# Patient Record
Sex: FEMALE | Race: WHITE | NOT HISPANIC OR LATINO | Employment: OTHER | URBAN - METROPOLITAN AREA
[De-identification: names, ages, dates, MRNs, and addresses within clinical notes are randomized per-mention and may not be internally consistent; named-entity substitution may affect disease eponyms.]

---

## 2017-08-11 ENCOUNTER — TRANSCRIBE ORDERS (OUTPATIENT)
Dept: ADMINISTRATIVE | Age: 62
End: 2017-08-11

## 2017-08-11 ENCOUNTER — APPOINTMENT (OUTPATIENT)
Dept: LAB | Age: 62
End: 2017-08-11

## 2017-08-11 DIAGNOSIS — Z00.8 HEALTH EXAMINATION IN POPULATION SURVEY: Primary | ICD-10-CM

## 2017-08-11 DIAGNOSIS — Z00.8 HEALTH EXAMINATION IN POPULATION SURVEY: ICD-10-CM

## 2017-08-11 LAB
CHOLEST SERPL-MCNC: 262 MG/DL (ref 50–200)
EST. AVERAGE GLUCOSE BLD GHB EST-MCNC: 111 MG/DL
HBA1C MFR BLD: 5.5 % (ref 4.2–6.3)
HDLC SERPL-MCNC: 83 MG/DL (ref 40–60)
LDLC SERPL CALC-MCNC: 155 MG/DL (ref 0–100)
TRIGL SERPL-MCNC: 118 MG/DL

## 2017-08-11 PROCEDURE — 83036 HEMOGLOBIN GLYCOSYLATED A1C: CPT

## 2017-08-11 PROCEDURE — 80061 LIPID PANEL: CPT

## 2017-08-11 PROCEDURE — 36415 COLL VENOUS BLD VENIPUNCTURE: CPT

## 2018-08-24 ENCOUNTER — TRANSCRIBE ORDERS (OUTPATIENT)
Dept: ADMINISTRATIVE | Facility: HOSPITAL | Age: 63
End: 2018-08-24

## 2018-08-24 ENCOUNTER — APPOINTMENT (OUTPATIENT)
Dept: LAB | Facility: HOSPITAL | Age: 63
End: 2018-08-24

## 2018-08-24 DIAGNOSIS — Z00.8 HEALTH EXAMINATION IN POPULATION SURVEYS: ICD-10-CM

## 2018-08-24 DIAGNOSIS — Z00.8 HEALTH EXAMINATION IN POPULATION SURVEYS: Primary | ICD-10-CM

## 2018-08-24 LAB
CHOLEST SERPL-MCNC: 257 MG/DL (ref 50–200)
HDLC SERPL-MCNC: 81 MG/DL (ref 40–60)
LDLC SERPL CALC-MCNC: 156 MG/DL (ref 0–100)
NONHDLC SERPL-MCNC: 176 MG/DL
TRIGL SERPL-MCNC: 100 MG/DL

## 2018-08-24 PROCEDURE — 83036 HEMOGLOBIN GLYCOSYLATED A1C: CPT

## 2018-08-24 PROCEDURE — 80061 LIPID PANEL: CPT

## 2018-08-24 PROCEDURE — 36415 COLL VENOUS BLD VENIPUNCTURE: CPT

## 2018-08-25 LAB
EST. AVERAGE GLUCOSE BLD GHB EST-MCNC: 105 MG/DL
HBA1C MFR BLD: 5.3 % (ref 4.2–6.3)

## 2022-09-06 ENCOUNTER — OFFICE VISIT (OUTPATIENT)
Dept: FAMILY MEDICINE CLINIC | Facility: MEDICAL CENTER | Age: 67
End: 2022-09-06
Payer: MEDICARE

## 2022-09-06 VITALS
HEART RATE: 78 BPM | DIASTOLIC BLOOD PRESSURE: 80 MMHG | BODY MASS INDEX: 25.16 KG/M2 | SYSTOLIC BLOOD PRESSURE: 130 MMHG | WEIGHT: 151 LBS | OXYGEN SATURATION: 98 % | TEMPERATURE: 98.2 F | HEIGHT: 65 IN

## 2022-09-06 DIAGNOSIS — Z13.1 SCREENING FOR DIABETES MELLITUS: ICD-10-CM

## 2022-09-06 DIAGNOSIS — G89.29 CHRONIC PAIN OF RIGHT KNEE: Primary | ICD-10-CM

## 2022-09-06 DIAGNOSIS — Z78.0 POSTMENOPAUSAL: ICD-10-CM

## 2022-09-06 DIAGNOSIS — Z12.11 SCREENING FOR MALIGNANT NEOPLASM OF COLON: ICD-10-CM

## 2022-09-06 DIAGNOSIS — Z00.00 ANNUAL PHYSICAL EXAM: ICD-10-CM

## 2022-09-06 DIAGNOSIS — Z12.31 ENCOUNTER FOR SCREENING MAMMOGRAM FOR BREAST CANCER: ICD-10-CM

## 2022-09-06 DIAGNOSIS — Z13.220 SCREENING FOR LIPID DISORDERS: ICD-10-CM

## 2022-09-06 DIAGNOSIS — M25.561 CHRONIC PAIN OF RIGHT KNEE: Primary | ICD-10-CM

## 2022-09-06 PROCEDURE — 99204 OFFICE O/P NEW MOD 45 MIN: CPT | Performed by: FAMILY MEDICINE

## 2022-09-06 RX ORDER — MULTIVITAMIN
1 CAPSULE ORAL DAILY
COMMUNITY

## 2022-09-06 NOTE — ASSESSMENT & PLAN NOTE
She is having chronic pain in her right knee for several months at least, is limiting her taking walks, etc  She has seen physical therapist and she needs an order for that  I suggest also that she get an x-ray  Follow-up as needed

## 2022-09-06 NOTE — PROGRESS NOTES
ADULT ANNUAL [address]  [facility]    NAME: Antonio Daley  AGE: 78 y o  SEX: female  : 1955     DATE: 2022     Assessment and Plan:     Problem List Items Addressed This Visit        Other    Chronic pain of right knee - Primary     She is having chronic pain in her right knee for several months at least, is limiting her taking walks, etc  She has seen physical therapist and she needs an order for that  I suggest also that she get an x-ray  Follow-up as needed  Relevant Orders    Ambulatory Referral to Physical Therapy    XR knee 3 vw right non injury      Other Visit Diagnoses     Encounter for screening mammogram for breast cancer        Relevant Orders    Mammo screening bilateral w 3d & cad    Postmenopausal        Screening for malignant neoplasm of colon        Relevant Orders    Cologuard    Screening for diabetes mellitus        Relevant Orders    Comprehensive metabolic panel    Screening for lipid disorders        Relevant Orders    Lipid Panel with Direct LDL reflex    Annual physical exam              Immunizations and preventive care screenings were discussed with patient today  Appropriate education was printed on patient's after visit summary  Counseling:  Exercise: the importance of regular exercise/physical activity was discussed  Recommend exercise 3-5 times per week for at least 30 minutes  No follow-ups on file  Chief Complaint:     Chief Complaint   Patient presents with   [facility] Establish Care     Would like to have an order for PT       History of Present Illness:     Adult Annual Physical   Patient here for a comprehensive physical exam  The patient reports no problems  This is a pleasant 61-year-old woman who is  and has three adult sons  She is a retired nurse in the [place]  She is not had any colon cancer and breast cancer screening    She agrees to getting a Cologuard and a mammogram     Diet and Physical Activity  Diet/Nutrition: well balanced diet  Exercise: walking  Depression Screening  PHQ-2/9 Depression Screening    Little interest or pleasure in doing things: 0 - not at all  Feeling down, depressed, or hopeless: 0 - not at all  PHQ-2 Score: 0  PHQ-2 Interpretation: Negative depression screen       General Health  Sleep: sleeps well  Hearing: normal - bilateral   Vision: no vision problems  Dental: regular dental visits  /GYN Health  Patient is: postmenopausal  Last menstrual period:   Contraceptive method: Not needed  Review of Systems:     Review of Systems   Constitutional: Negative for chills and fever  HENT: Negative for ear pain and sore throat  Eyes: Negative for pain and visual disturbance  Respiratory: Negative for cough and shortness of breath  Cardiovascular: Negative for chest pain and palpitations  Gastrointestinal: Negative for abdominal pain and vomiting  Genitourinary: Negative for dysuria and hematuria  Musculoskeletal: Positive for arthralgias ( right knee pain)  Negative for back pain  Skin: Negative for color change and rash  Neurological: Negative for seizures and syncope  Psychiatric/Behavioral: Negative for dysphoric mood  The patient is not nervous/anxious  All other systems reviewed and are negative  Past Medical History:     History reviewed  No pertinent past medical history     Past Surgical History:     Past Surgical History:   Procedure Laterality Date     SECTION        Social History:     Social History     Socioeconomic History    Marital status: /Civil Union     Spouse name: None    Number of children: None    Years of education: None    Highest education level: None   Occupational History    None   Tobacco Use    Smoking status: Never Smoker    Smokeless tobacco: Never Used   Substance and Sexual Activity    Alcohol use: Yes     Comment: socially    Drug use: Never    Sexual activity: None   Other Topics Concern    None   Social History Narrative    None     Social Determinants of Health     Financial Resource Strain: Not on file   Food Insecurity: Not on file   Transportation Needs: Not on file   Physical Activity: Not on file   Stress: Not on file   Social Connections: Not on file   Intimate Partner Violence: Not on file   Housing Stability: Not on file      Family History:     Family History   Problem Relation Age of Onset    Coronary artery disease Mother     Dementia Father     No Known Problems Sister     No Known Problems Sister     No Known Problems Brother     No Known Problems Brother     No Known Problems Brother     No Known Problems Son     No Known Problems Son     No Known Problems Son       Current Medications:     Current Outpatient Medications   Medication Sig Dispense Refill    Ascorbic Acid (VITAMIN C PO) Take by mouth      MAGNESIUM PO Take by mouth      Multiple Vitamin (multivitamin) capsule Take 1 capsule by mouth daily      Multiple Vitamins-Minerals (VITAMIN D3 COMPLETE PO) Take by mouth      Multiple Vitamins-Minerals (ZINC PO) Take by mouth       No current facility-administered medications for this visit  Allergies:     No Known Allergies   Physical Exam:     /80 (BP Location: Left arm, Patient Position: Sitting, Cuff Size: Adult)   Pulse 78   Temp 98 2 °F (36 8 °C)   Ht 5' 5" (1 651 m)   Wt 68 5 kg (151 lb)   SpO2 98%   BMI 25 13 kg/m²     Physical Exam  Vitals and nursing note reviewed  Constitutional:       Appearance: Normal appearance  She is well-developed  HENT:      Head: Normocephalic  Right Ear: Tympanic membrane and ear canal normal       Left Ear: Tympanic membrane and ear canal normal       Nose: Nose normal  No mucosal edema, congestion or rhinorrhea  Mouth/Throat:      Mouth: Mucous membranes are moist       Pharynx: Uvula midline  No oropharyngeal exudate        Tonsils: No tonsillar exudate  Eyes:      General: Lids are normal       Conjunctiva/sclera: Conjunctivae normal       Pupils: Pupils are equal, round, and reactive to light  Neck:      Thyroid: No thyromegaly  Vascular: No carotid bruit  Trachea: Trachea normal    Cardiovascular:      Rate and Rhythm: Normal rate and regular rhythm  Pulses: Normal pulses  Heart sounds: Normal heart sounds, S1 normal and S2 normal  No murmur heard  Pulmonary:      Effort: Pulmonary effort is normal  No respiratory distress  Breath sounds: Normal breath sounds  No wheezing, rhonchi or rales  Abdominal:      General: Bowel sounds are normal       Palpations: Abdomen is soft  Tenderness: There is no abdominal tenderness  Hernia: No hernia is present  Musculoskeletal:         General: No swelling or deformity  Normal range of motion  Cervical back: Normal range of motion  Lymphadenopathy:      Cervical: No cervical adenopathy  Skin:     General: Skin is warm and dry  Findings: No rash  Neurological:      General: No focal deficit present  Mental Status: She is alert and oriented to person, place, and time  Mental status is at baseline  Cranial Nerves: No cranial nerve deficit  Sensory: No sensory deficit  Coordination: Coordination normal       Deep Tendon Reflexes: Reflexes are normal and symmetric  Psychiatric:         Mood and Affect: Mood normal          Speech: Speech normal          Behavior: Behavior normal  Behavior is cooperative  Thought Content:  Thought content normal          Judgment: Judgment normal           Pam Talamantes MD  [address] [facility]

## 2022-09-06 NOTE — PATIENT INSTRUCTIONS

## 2022-09-14 ENCOUNTER — APPOINTMENT (OUTPATIENT)
Dept: LAB | Facility: MEDICAL CENTER | Age: 67
End: 2022-09-14
Payer: MEDICARE

## 2022-09-14 ENCOUNTER — APPOINTMENT (OUTPATIENT)
Dept: RADIOLOGY | Facility: MEDICAL CENTER | Age: 67
End: 2022-09-14
Payer: MEDICARE

## 2022-09-14 DIAGNOSIS — Z13.220 SCREENING FOR LIPID DISORDERS: ICD-10-CM

## 2022-09-14 DIAGNOSIS — Z13.1 SCREENING FOR DIABETES MELLITUS: ICD-10-CM

## 2022-09-14 DIAGNOSIS — M25.561 CHRONIC PAIN OF RIGHT KNEE: ICD-10-CM

## 2022-09-14 DIAGNOSIS — G89.29 CHRONIC PAIN OF RIGHT KNEE: ICD-10-CM

## 2022-09-14 LAB
ALBUMIN SERPL BCP-MCNC: 4.1 G/DL (ref 3.5–5)
ALP SERPL-CCNC: 85 U/L (ref 46–116)
ALT SERPL W P-5'-P-CCNC: 27 U/L (ref 12–78)
ANION GAP SERPL CALCULATED.3IONS-SCNC: 5 MMOL/L (ref 4–13)
AST SERPL W P-5'-P-CCNC: 13 U/L (ref 5–45)
BILIRUB SERPL-MCNC: 0.7 MG/DL (ref 0.2–1)
BUN SERPL-MCNC: 13 MG/DL (ref 5–25)
CALCIUM SERPL-MCNC: 9.2 MG/DL (ref 8.3–10.1)
CHLORIDE SERPL-SCNC: 108 MMOL/L (ref 96–108)
CHOLEST SERPL-MCNC: 224 MG/DL
CO2 SERPL-SCNC: 25 MMOL/L (ref 21–32)
CREAT SERPL-MCNC: 0.87 MG/DL (ref 0.6–1.3)
GFR SERPL CREATININE-BSD FRML MDRD: 69 ML/MIN/1.73SQ M
GLUCOSE P FAST SERPL-MCNC: 104 MG/DL (ref 65–99)
HDLC SERPL-MCNC: 66 MG/DL
LDLC SERPL CALC-MCNC: 141 MG/DL (ref 0–100)
POTASSIUM SERPL-SCNC: 4.2 MMOL/L (ref 3.5–5.3)
PROT SERPL-MCNC: 7.4 G/DL (ref 6.4–8.4)
SODIUM SERPL-SCNC: 138 MMOL/L (ref 135–147)
TRIGL SERPL-MCNC: 84 MG/DL

## 2022-09-14 PROCEDURE — 73562 X-RAY EXAM OF KNEE 3: CPT

## 2022-09-14 PROCEDURE — 36415 COLL VENOUS BLD VENIPUNCTURE: CPT

## 2022-09-14 PROCEDURE — 80061 LIPID PANEL: CPT

## 2022-09-14 PROCEDURE — 80053 COMPREHEN METABOLIC PANEL: CPT

## 2022-09-15 ENCOUNTER — EVALUATION (OUTPATIENT)
Dept: PHYSICAL THERAPY | Facility: CLINIC | Age: 67
End: 2022-09-15
Payer: MEDICARE

## 2022-09-15 DIAGNOSIS — M25.561 CHRONIC PAIN OF RIGHT KNEE: ICD-10-CM

## 2022-09-15 DIAGNOSIS — G89.29 CHRONIC PAIN OF RIGHT KNEE: ICD-10-CM

## 2022-09-15 PROCEDURE — 97161 PT EVAL LOW COMPLEX 20 MIN: CPT | Performed by: PHYSICAL THERAPIST

## 2022-09-15 PROCEDURE — 97110 THERAPEUTIC EXERCISES: CPT | Performed by: PHYSICAL THERAPIST

## 2022-09-15 PROCEDURE — 97140 MANUAL THERAPY 1/> REGIONS: CPT | Performed by: PHYSICAL THERAPIST

## 2022-09-15 NOTE — PROGRESS NOTES
PT Evaluation     Today's date: 9/15/2022  Patient name: Delvis Engle  : 1955  MRN: [id number]  Referring provider: Qing Clayton MD  Dx:   Encounter Diagnosis     ICD-10-CM    1  Chronic pain of right knee  M25 561 Ambulatory Referral to Physical Therapy    G89 29        Start Time: 1015  Stop Time: 1100  Total time in clinic (min): 45 minutes    Assessment  Assessment details: Patient is a 78 y o  female who presents to physical therapy with c/o chronic right knee pain  Patient presents to evaluation with pain, decreased range of motion, decreased strength, decreased quality of gait/ambulatory status, and decreased tolerance to activity  Patient demonstrates excellent tolerance to treatment and was provided with a written copy of their initial home exercise program focusing on knee ROM and was encouraged to perform daily per tolerance  I discussed risks, benefits, and alternatives to treatment, and answered all patient questions to patient satisfaction  Patient presents with baseline FOTO score of 47 indicating limited tolerance/ability to complete ADLs  Patient is an appropriate candidate for skilled PT and would benefit from skilled PT services to address the aforementioned impairments, achieve goals, maximize function, and improve quality of life  Pt is in agreement with this plan      Patient Education: activity modifications as needed, pacing of activities, importance of HEP compliance, PT prognosis/POC    Impairments: abnormal gait, abnormal or restricted ROM, activity intolerance, impaired balance, impaired physical strength, lacks appropriate home exercise program and pain with function    Goals  ST weeks  Pt will demonstrate good understanding and compliance with HEP   Pt will increase active right knee extension to 0* to normalize gait with TKE at IC phase of gait   Pt will increase active right knee flexion to 120* to normalize gait and improve tolerance/ability to perform functional activities to include stairs and squatting   Pt will decrease right knee pain to 3-4/10 with activity    LT weeks  Pt will increase right hip/knee strength to 5-/5 to allow for improved ability to squat, negotiate stairs, and prolonged community ambulation  Pt will decrease right knee pain to 0-1/10 with activity   Pt will demonstrate negative Elys test on right   Pt will ambulate with least restrictive AD and normal gait mechanics  Pt will exhibit proper squatting/lifting mechanics without reliance on external cues for correction of form   Pt will be able to tolerate prolonged standing/walking activities > 30 minutes without provocation of knee pain    Pt will improve FOTO score to > or = to 68 to indicate improved functional abilities       Plan  Plan details: 2-3x/wk  Patient would benefit from: skilled physical therapy and PT eval  Planned modality interventions: cryotherapy and thermotherapy: hydrocollator packs  Planned therapy interventions: ADL training, manual therapy, neuromuscular re-education, patient education, self care, strengthening, stretching, therapeutic activities, therapeutic exercise, home exercise program, graded exercise, graded activity, gait training, functional ROM exercises, flexibility, body mechanics training and balance  Frequency: 3x week  Duration in weeks: 8  Plan of Care beginning date: 9/15/2022  Plan of Care expiration date: 11/10/2022  Treatment plan discussed with: patient        Subjective Evaluation    History of Present Illness  Mechanism of injury: Pt reports to PT with c/o right knee pain that started about 2 years ago that progressively worsened with time  Pt states she had an MRI about 8 years ago that revealed meniscus tears and cartilage defects but never did anything about it  Pt states she had surgery in  due to having a dislocated knee cap and had a full recovery from this   Pt states over time she started to lose knee ROM and now can't bend or straighten knee  Pt ambulates unassisted but walks with abnormal gait  Pt reports pain with prolonged walking/standing, stairs, squatting, kneeling, sleeping at night, and morning stiffness  Pt denies buckling symptoms but does note hx of infrequent locking episodes  Pt denies N/T into extremities  Pt denies prior treatment to knee       Aggravating factors: prolonged walking/standing, stairs, squatting, kneeling, sleeping at night, and morning stiffness    Easing Factors:  Rest, ice    PLOF:  Hx of knee surgery in , constant knee pain for past 2 years    PMH: Hx of knee surgery in   Pain  Current pain ratin  At best pain ratin  At worst pain ratin  Quality: sharp and dull ache      Diagnostic Tests  X-ray: abnormal  Patient Goals  Patient goals for therapy: decreased pain, improved balance, increased motion, increased strength, independence with ADLs/IADLs and return to sport/leisure activities          Objective     General Comments:      Knee Comments  Right knee AROM: 0-20-70* (stiff/pain with end ranges) - improved post tx to 0-10-80*    Right knee MMT: Flexion 5-/5 Extension 5-/5    Right hip MMT: Flex 4/5 Abd 4/5 Extension 4/5    Gait Assessment: Pt ambulates unassisted with antalgic gait, maintains knee flexion throughout gait cycle, circumduction of right leg during mid-swing phase of gait    Palpation: no TTP about knee    Varus stress: (-)  Valgus stress: (-)  Ant Drawer: (-)  Lachmans: (-)  Patellar Compression: (-)  Patellar Apprehension: (-)  Apley: unable to assess due to ROM limitations  Elys: unable to assess due to ROM limitations    FOTO: 47               Diagnosis: R knee pain   Precautions: (-)   POC Expires: 11/10/22   Re-evaluation Date: 10/13/22   FOTO Scores/Date: Goal - 68; 9/15/22 - 47   Visit Count 1/10       Manuals 9/15       R knee PROM all planes KD                                       Ther Ex 9/15       Heel slides with strap 10x5" (78*), then 10x actively       Seated knee flexion at table 10x10"       Heel prop 3# 3 min       Quad set 10x10"                                        HEP Creation/instruction       Neuro Re-Ed                                                                                                                       Ther Act                                         Modalities

## 2022-09-16 ENCOUNTER — OFFICE VISIT (OUTPATIENT)
Dept: PHYSICAL THERAPY | Facility: CLINIC | Age: 67
End: 2022-09-16
Payer: MEDICARE

## 2022-09-16 DIAGNOSIS — M25.561 CHRONIC PAIN OF RIGHT KNEE: Primary | ICD-10-CM

## 2022-09-16 DIAGNOSIS — G89.29 CHRONIC PAIN OF RIGHT KNEE: Primary | ICD-10-CM

## 2022-09-16 PROCEDURE — 97140 MANUAL THERAPY 1/> REGIONS: CPT | Performed by: PHYSICAL THERAPIST

## 2022-09-16 PROCEDURE — 97110 THERAPEUTIC EXERCISES: CPT | Performed by: PHYSICAL THERAPIST

## 2022-09-16 NOTE — PROGRESS NOTES
Daily Note     Today's date: 2022  Patient name: Carmine Conway  : 1955  MRN: [id number]  Referring provider: Bay Scott MD  Dx:   Encounter Diagnosis     ICD-10-CM    1  Chronic pain of right knee  M25 561     G89 29        Start Time: 930  Stop Time: 1015  Total time in clinic (min): 45 minutes    Subjective: Pt denies any pain after initial evaluation and felt better, has been compliant with her home exercises  Objective: See treatment diary below      Assessment: Pt demonstrates gradual improvement in knee ROM and improved tolerance with manual interventions with less guarding  Pt was able to progress knee flexion ROM with addition of wall slides with good tolerance - occasional cues to avoid compensatory hip hiking and weight shift to left during ROM exercises and manual PROM  Pt was able to progress active knee flexion/extension with HS curls and LAQs with cues for short isometric hold at end range with extensive cues during LAQs to avoid compensatory hip flexion and extending spine to ensure motion is achieved at knee  Pt was issued updated HEP and encouraged to continue with daily, as well as working on gait mechanics achieving adequate hip/knee flexion through mid-swing phase of gait and TKE at IC and mid-stance without circumduction of leg  Will continue to progress as able next visit  Plan: Continue per plan of care  Progress treatment as tolerated         Diagnosis: R knee pain   Precautions: (-)   POC Expires: 11/10/22   Re-evaluation Date: 10/13/22   FOTO Scores/Date: Goal - 68; 9/15/22 - 52   Visit Count 1/10 2/10      Manuals 9/15 9/16      R knee PROM all planes KD KD                                      Ther Ex 9/15 9/16      Bike  3 min half revolutions      Heel slides with strap 10x5" (78*), then 10x actively 10x5" (85*), then 10x actively      Wall slides  3 min      HS curls  Red 20x3"      Seated knee flexion at table 10x10" HEP      Heel prop 3# 3 min 3# 3 min Quad set 10x10"  10x10"      LAQ  20x3"      Standing TKE  Blue 10x10"                      HEP Creation/instruction Updated       Neuro Re-Ed                                                                                                                       Ther Act                                         Modalities

## 2022-09-20 ENCOUNTER — APPOINTMENT (OUTPATIENT)
Dept: PHYSICAL THERAPY | Facility: CLINIC | Age: 67
End: 2022-09-20
Payer: MEDICARE

## 2022-09-20 ENCOUNTER — OFFICE VISIT (OUTPATIENT)
Dept: PHYSICAL THERAPY | Facility: CLINIC | Age: 67
End: 2022-09-20
Payer: MEDICARE

## 2022-09-20 DIAGNOSIS — M25.561 CHRONIC PAIN OF RIGHT KNEE: Primary | ICD-10-CM

## 2022-09-20 DIAGNOSIS — G89.29 CHRONIC PAIN OF RIGHT KNEE: Primary | ICD-10-CM

## 2022-09-20 PROCEDURE — 97110 THERAPEUTIC EXERCISES: CPT | Performed by: PHYSICAL THERAPIST

## 2022-09-20 PROCEDURE — 97140 MANUAL THERAPY 1/> REGIONS: CPT | Performed by: PHYSICAL THERAPIST

## 2022-09-20 NOTE — PROGRESS NOTES
Daily Note     Today's date: 2022  Patient name: Ansley Barker  : 1955  MRN: [id number]  Referring provider: Anatoliy Morrow MD  Dx:   Encounter Diagnosis     ICD-10-CM    1  Chronic pain of right knee  M25 561     G89 29        Start Time: 1145  Stop Time: 1230  Total time in clinic (min): 45 minutes    Subjective: Pt reports current pain 3-/10, states last night a bad night due to weather and being up on her feet for a long time  Pt overall feels good progress with motion and feels she is walking better  Objective: See treatment diary below      Assessment:  Pt continues to make steady gains in knee mobility with improving tolerance with manual tx, however still with moderate restrictions into both end range flexion/extension with best measurements obtained today to be 0-8-86* with A/AAROM  Trialed tibiofemoral distraction with medial gapping to help reduce knee pain with favorably response  Added prone quad stretch with perceived end range stiffness and quad stretching noted and instructed to add to HEP  Pt was also able to progress closed chain knee flexion to include TRX assisted DL squats with extensive cues on avoiding compensatory weight shift to left or moving left foot posteriorly to bias left leg  Pt reports significant reduction in pain and improved mobility/walking tolerance post tx  HEP was updated and reviewed  Plan: Continue per plan of care  Progress treatment as tolerated         Diagnosis: R knee pain   Precautions: (-)   POC Expires: 11/10/22   Re-evaluation Date: 10/13/22   FOTO Scores/Date: Goal - 68; 9/15/22 - 52   Visit Count 1/10 2/10 3/10     Manuals 9/15 9/16 9/20     R knee PROM all planes KD KD KD     Tibia-femoral distraction w/ medial opening   KD                             Ther Ex 9/15 9/16 9/20     Bike  3 min half revolutions 4 min half revolutions     Heel slides with strap 10x5" (78*), then 10x actively 10x5" (85*), then 10x actively 10x5" (86*), then 10x actively     Prone quad stretch   3x30"      Wall slides  3 min 3 min (85*)     HS curls  Red 20x3" Red 20x3"      Heel prop 3# 3 min 3# 3 min 5# 3 min     Quad set 10x10"  10x10" 10x10"     LAQ  20x3" 20x3"     Standing TKE  Blue 10x10" Blue 10x10"      TRX    DL squat depth to tolerance 2x10             HEP Creation/instruction Updated  Updated      Neuro Re-Ed                                                                                                                       Ther Act                                         Modalities

## 2022-09-21 ENCOUNTER — OFFICE VISIT (OUTPATIENT)
Dept: PHYSICAL THERAPY | Facility: CLINIC | Age: 67
End: 2022-09-21
Payer: MEDICARE

## 2022-09-21 DIAGNOSIS — G89.29 CHRONIC PAIN OF RIGHT KNEE: Primary | ICD-10-CM

## 2022-09-21 DIAGNOSIS — M25.561 CHRONIC PAIN OF RIGHT KNEE: Primary | ICD-10-CM

## 2022-09-21 PROCEDURE — 97110 THERAPEUTIC EXERCISES: CPT | Performed by: PHYSICAL THERAPIST

## 2022-09-21 PROCEDURE — 97140 MANUAL THERAPY 1/> REGIONS: CPT | Performed by: PHYSICAL THERAPIST

## 2022-09-21 NOTE — PROGRESS NOTES
Daily Note     Today's date: 2022  Patient name: Jeana Fatima  : 1955  MRN: [id number]  Referring provider: Barry Bautista MD  Dx:   Encounter Diagnosis     ICD-10-CM    1  Chronic pain of right knee  M25 561     G89 29        Start Time: 0800  Stop Time: 0850  Total time in clinic (min): 50 minutes    Subjective: Pt reports stiffness this morning with normal pain last night as she usually gets, felt better after last visit with improved motion/walking  Objective: See treatment diary below      Assessment: Pt continues to make gradual progressions in A/AAROM with more c/o end range stiffness versus pain  Pt was able to further progress closed chain strengthening with step ups with extensive cues for proper hip/knee flexion without circumduction of right leg during concentric phase, as well as proper hinging of knee during eccentric lowering with improving form with repetition  Pt less reliant on cues with improving form during TRX assisted DL squats with less discomfort/stiffness noted at end range  Pt encouraged to continue with her home exercises daily as symptoms allow and will progress next visit as able  Plan: Continue per plan of care  Progress treatment as tolerated         Diagnosis: R knee pain   Precautions: (-)   POC Expires: 11/10/22   Re-evaluation Date: 10/13/22   FOTO Scores/Date: Goal - 68; 9/15/22 - 52   Visit Count 1/10 2/10 3/10 4/10    Manuals 9/15 9/16 9/20 9/21    R knee PROM all planes KD KD KD KD    Tibia-femoral distraction w/ medial opening   KD KD                            Ther Ex 9/15 9/16 9/20 9/21    Bike  3 min half revolutions 4 min half revolutions 4 min half revolutions    Heel slides with strap 10x5" (78*), then 10x actively 10x5" (85*), then 10x actively 10x5" (86*), then 10x actively 10x5" (87*), then 10x actively    Prone quad stretch   3x30"  3x30"     Wall slides  3 min 3 min (85*) 3 min (89*)    HS curls  Red 20x3" Red 20x3"  Red 20x3"     Heel prop 3# 3 min 3# 3 min 5# 3 min 5# 3 min    Quad set 10x10"  10x10" 10x10" 10x10" (-6*)    LAQ  20x3" 20x3" 20x3"    Standing TKE  Blue 10x10" Blue 10x10"  Blue 10x10"     TRX    DL squat depth to tolerance 2x10 DL squat depth to tolerance 2x10    Leg press    DL 85# 2x10    Step ups    8" 2x10                            HEP Creation/instruction Updated  Updated  Updated    Neuro Re-Ed                                                                                                                       Ther Act                                         Modalities

## 2022-09-22 ENCOUNTER — OFFICE VISIT (OUTPATIENT)
Dept: PHYSICAL THERAPY | Facility: CLINIC | Age: 67
End: 2022-09-22
Payer: MEDICARE

## 2022-09-22 DIAGNOSIS — G89.29 CHRONIC PAIN OF RIGHT KNEE: Primary | ICD-10-CM

## 2022-09-22 DIAGNOSIS — M25.561 CHRONIC PAIN OF RIGHT KNEE: Primary | ICD-10-CM

## 2022-09-22 PROCEDURE — 97110 THERAPEUTIC EXERCISES: CPT | Performed by: PHYSICAL THERAPIST

## 2022-09-22 PROCEDURE — 97140 MANUAL THERAPY 1/> REGIONS: CPT | Performed by: PHYSICAL THERAPIST

## 2022-09-22 NOTE — PROGRESS NOTES
Daily Note     Today's date: 2022  Patient name: Tamara Cheek  : 1955  MRN: [id number]  Referring provider: Christine Ellis MD  Dx:   Encounter Diagnosis     ICD-10-CM    1  Chronic pain of right knee  M25 561     G89 29        Start Time: 1545  Stop Time: 1635  Total time in clinic (min): 50 minutes    Subjective: Pt reports feeling better after last visit but woke up this morning with more pain, rated 7-8/10 and attributes this to the inclement weather but felt better as she got moving for the day  Objective: See treatment diary below      Assessment: Pt continues to progress well with ROM and exhibiting improving functional knee flexion/extension mobility with squatting and step ups but still reliant on cues for correction of form and avoidance of compensatory strategies  Added nadeem step overs to work on further challenging of gait mechanics with cues to achieve proper hip/knee flexion during mid-swing phase of gait and TKE at IC and mid-stance phases of gait with tendencies to circumduction leg and extend hip during mid-swing portion but corrected with cues  Pt continues to note reduced pain and improved quality of gait post tx  HEP was updated and reviewed  Plan: Continue per plan of care  Progress treatment as tolerated         Diagnosis: R knee pain   Precautions: (-)   POC Expires: 11/10/22   Re-evaluation Date: 10/13/22   FOTO Scores/Date: Goal - 68; 9/15/22 - 52   Visit Count 1/10 2/10 3/10 4/10 5/10   Manuals 9/15 9/16 9/20 9/21 9/22   R knee PROM all planes KD KD KD KD KD   Tibia-femoral distraction w/ medial opening   KD KD KD                           Ther Ex 9/15 9/16 9/20 9/21 9/22   Bike  3 min half revolutions 4 min half revolutions 4 min half revolutions 4 min half revolutions   Heel slides with strap 10x5" (78*), then 10x actively 10x5" (85*), then 10x actively 10x5" (86*), then 10x actively 10x5" (87*), then 10x actively 10x5" (87*), then 10x actively   Prone quad stretch   3x30"  3x30"  3x30"   Wall slides  3 min 3 min (85*) 3 min (89*) 3 min   HS curls  Red 20x3" Red 20x3"  Red 20x3"  Red 20x3"   Heel prop 3# 3 min 3# 3 min 5# 3 min 5# 3 min 5# 3 min   Quad set 10x10"  10x10" 10x10" 10x10" (-6*) 10x10"    LAQ  20x3" 20x3" 20x3" 20x3"   Standing TKE  Blue 10x10" Blue 10x10"  Blue 10x10"  Blue 10x10"   TRX    DL squat depth to tolerance 2x10 DL squat depth to tolerance 2x10 DL squat depth to tolerance 2x10   Leg press    DL 85# 2x10 DL 95# 2x10   Step ups    8" 2x10 8" 2x10    Blu step overs     12" hurdles 2 laps - extensive cues for gait mechanics                   HEP Creation/instruction Updated  Updated  Updated    Neuro Re-Ed                                                                                                                       Ther Act                                         Modalities

## 2022-09-27 ENCOUNTER — OFFICE VISIT (OUTPATIENT)
Dept: PHYSICAL THERAPY | Facility: CLINIC | Age: 67
End: 2022-09-27
Payer: MEDICARE

## 2022-09-27 DIAGNOSIS — M25.561 CHRONIC PAIN OF RIGHT KNEE: Primary | ICD-10-CM

## 2022-09-27 DIAGNOSIS — G89.29 CHRONIC PAIN OF RIGHT KNEE: Primary | ICD-10-CM

## 2022-09-27 PROCEDURE — 97110 THERAPEUTIC EXERCISES: CPT | Performed by: PHYSICAL THERAPIST

## 2022-09-27 PROCEDURE — 97140 MANUAL THERAPY 1/> REGIONS: CPT | Performed by: PHYSICAL THERAPIST

## 2022-09-27 NOTE — PROGRESS NOTES
Daily Note     Today's date: 2022  Patient name: Tamara Cheek  : 1955  MRN: [id number]  Referring provider: Christine Ellis MD  Dx:   Encounter Diagnosis     ICD-10-CM    1  Chronic pain of right knee  M25 561     G89 29                   Subjective: patient offers no new complaints  Objective: See treatment diary below      Assessment: patient tolerated treatment well  Able to complete and progress program as noted below  She continues to be limited in her range of motion but does demonstrate improved motion at end of session compared to start  She would benefit from continuing physical therapy  Plan: Continue per plan of care        Diagnosis: R knee pain   Precautions: (-)   POC Expires: 11/10/22   Re-evaluation Date: 10/13/22   FOTO Scores/Date:  - ; 9/15/22 -    Visit Count 6/10  3/10 4/10 5/10   Manuals    R knee PROM all planes SK  KD KD KD   Tibia-femoral distraction w/ medial opening SK  KD KD KD                           Ther Ex    Bike 4 min half revolutions  4 min half revolutions 4 min half revolutions 4 min half revolutions   Heel slides with strap 10x5" then   10x actively  10x5" (86*), then 10x actively 10x5" (87*), then 10x actively 10x5" (87*), then 10x actively   Prone quad stretch 3x30"   3x30"  3x30"  3x30"   Wall slides 3 min   3 min (85*) 3 min (89*) 3 min   HS curls Red 20x3"  Red 20x3"  Red 20x3"  Red 20x3"   Heel prop 5# 3 min  5# 3 min 5# 3 min 5# 3 min   Quad set 10x10"   10x10" 10x10" (-6*) 10x10"    LAQ 20x3"   20x3" 20x3" 20x3"   Standing TKE Blue 10x10"   Blue 10x10"  Blue 10x10"  Blue 10x10"   TRX  DL squat depth to tolerance 2x10   DL squat depth to tolerance 2x10 DL squat depth to tolerance 2x10 DL squat depth to tolerance 2x10   Leg press DL 95# 2x10    DL 85# 2x10 DL 95# 2x10   Step ups 8" 3x10    8" 2x10 8" 2x10    Blu step overs nv    12" hurdles 2 laps - extensive cues for gait mechanics HEP   Updated  Updated    Neuro Re-Ed                                                                                                                     Ther Act                                   Modalities

## 2022-09-28 ENCOUNTER — PATIENT MESSAGE (OUTPATIENT)
Dept: FAMILY MEDICINE CLINIC | Facility: MEDICAL CENTER | Age: 67
End: 2022-09-28

## 2022-09-29 ENCOUNTER — OFFICE VISIT (OUTPATIENT)
Dept: PHYSICAL THERAPY | Facility: CLINIC | Age: 67
End: 2022-09-29
Payer: MEDICARE

## 2022-09-29 DIAGNOSIS — M25.561 CHRONIC PAIN OF RIGHT KNEE: Primary | ICD-10-CM

## 2022-09-29 DIAGNOSIS — G89.29 CHRONIC PAIN OF RIGHT KNEE: Primary | ICD-10-CM

## 2022-09-29 PROCEDURE — 97110 THERAPEUTIC EXERCISES: CPT | Performed by: PHYSICAL MEDICINE & REHABILITATION

## 2022-09-29 PROCEDURE — 97140 MANUAL THERAPY 1/> REGIONS: CPT | Performed by: PHYSICAL MEDICINE & REHABILITATION

## 2022-09-29 NOTE — PROGRESS NOTES
Daily Note     Today's date: 2022  Patient name: Trang Simpson  : 1955  MRN: [id number]  Referring provider: Maryann Sanz MD  Dx:   Encounter Diagnosis     ICD-10-CM    1  Chronic pain of right knee  M25 561     G89 29                   Subjective: Patient offers no new complaints to begin session, continued ROM deficit  Objective: See treatment diary below    Assessment: Patient tolerated treatment well overall  Frequent VCs for proper activation and to decrease compensatory pattern  Added bike at end of session to promote active use of ROM gained throughout session  Continue as able nv  Plan: Continue per plan of care        Diagnosis: R knee pain   Precautions: (-)   POC Expires: 11/10/22   Re-evaluation Date: 10/13/22   FOTO Scores/Date: Goal - ; 9/15/22 -    Visit Count 6/10 7/10  4/10 5/10   Manuals    R knee PROM all planes SK   KD KD   Tibia-femoral distraction w/ medial opening SK   KD KD                           Ther Ex    Bike 4 min half revolutions 4'  4 min half revolutions 4 min half revolutions   Heel slides with strap 10x5" then   10x actively 10x5" ea, PROM/AROM  10x5" (87*), then 10x actively 10x5" (87*), then 10x actively   Prone quad stretch 3x30"    3x30"  3x30"   Wall slides 3 min    3 min (89*) 3 min   HS curls Red 20x3" RTB 20x3"  Red 20x3"  Red 20x3"   Heel prop 5# 3 min   5# 3 min 5# 3 min   Quad set 10x10"  10x10"  10x10" (-6*) 10x10"    LAQ 20x3"  20x3"  20x3" 20x3"   Standing TKE Blue 10x10"  LP 75# 2x10x5"  Blue 10x10"  Blue 10x10"   TRX  DL squat depth to tolerance 2x10    DL squat depth to tolerance 2x10 DL squat depth to tolerance 2x10   Leg press DL 95# 2x10  75# 2 up/1 down 2x10  DL 85# 2x10 DL 95# 2x10   Step ups 8" 3x10    8" 2x10 8" 2x10    Blu step overs nv    12" hurdles 2 laps - extensive cues for gait mechanics                   HEP    Updated    Neuro Re-Ed Ther Act                                   Modalities

## 2022-09-30 ENCOUNTER — APPOINTMENT (OUTPATIENT)
Dept: PHYSICAL THERAPY | Facility: CLINIC | Age: 67
End: 2022-09-30
Payer: MEDICARE

## 2022-09-30 ENCOUNTER — TELEPHONE (OUTPATIENT)
Dept: FAMILY MEDICINE CLINIC | Facility: MEDICAL CENTER | Age: 67
End: 2022-09-30

## 2022-09-30 DIAGNOSIS — G89.29 CHRONIC PAIN OF RIGHT KNEE: Primary | ICD-10-CM

## 2022-09-30 DIAGNOSIS — M25.561 CHRONIC PAIN OF RIGHT KNEE: Primary | ICD-10-CM

## 2022-09-30 LAB
DME PARACHUTE DELIVERY DATE REQUESTED: NORMAL
DME PARACHUTE ITEM DESCRIPTION: NORMAL
DME PARACHUTE ORDER STATUS: NORMAL
DME PARACHUTE SUPPLIER NAME: NORMAL
DME PARACHUTE SUPPLIER PHONE: NORMAL

## 2022-09-30 NOTE — TELEPHONE ENCOUNTER
Pt called back and clarified  Right knee  brace, order placed in [facility]  Order sent to provider to sign off on   Hold to follow up

## 2022-09-30 NOTE — TELEPHONE ENCOUNTER
Pt sent [facility] email requesting a knee brace  Dr Montes De Oca Dulanita us ordering this  Need to clarify does she need the  knee brace? What knee is it for? Also why is it needed so that we have a DX code to use

## 2022-09-30 NOTE — TELEPHONE ENCOUNTER
From: Carmine Conway  To: Bereket Patterson MD  Sent: 9/28/2022 12:20 PM EDT  Subject: Brace    My physical therapist would like me to have a medial off loading knee brace and said I had to message you because they can not write a prescription for it       Thank you

## 2022-10-04 ENCOUNTER — OFFICE VISIT (OUTPATIENT)
Dept: PHYSICAL THERAPY | Facility: CLINIC | Age: 67
End: 2022-10-04
Payer: MEDICARE

## 2022-10-04 DIAGNOSIS — M25.561 CHRONIC PAIN OF RIGHT KNEE: Primary | ICD-10-CM

## 2022-10-04 DIAGNOSIS — G89.29 CHRONIC PAIN OF RIGHT KNEE: Primary | ICD-10-CM

## 2022-10-04 PROCEDURE — 97140 MANUAL THERAPY 1/> REGIONS: CPT

## 2022-10-04 PROCEDURE — 97110 THERAPEUTIC EXERCISES: CPT

## 2022-10-04 NOTE — PROGRESS NOTES
Daily Note     Today's date: 10/4/2022  Patient name: Dorien Felty  : 1955  MRN: [id number]  Referring provider: Fransisca Barker MD  Dx:   Encounter Diagnosis     ICD-10-CM    1  Chronic pain of right knee  M25 561     G89 29        Start Time: 1015  Stop Time: 1128  Total time in clinic (min): 73 minutes    Subjective: patient reports overall improvement  Reports decreased pain and improved function  Reports some increased achyness coming into therapy due to rainy weather      Objective: See treatment diary below      Assessment:  Patient reports decreased soreness post therapy with feeling of improved mobility  Heavy focus spent on improving both patient's available flexion and extension  Utilized long duration stretches with cueing on set up, positioning, and compensations  Patient was able to achieve 91* of knee flexion with strap pull post manuals  Cueing quad sets and TKE to address hip ext compensation  Was able to introduce 3# load with LAQ along with iso hold  Plan: Continue per plan of care  Progress treatment as tolerated         Diagnosis: R knee pain   Precautions: (-)   POC Expires: 11/10/22   Re-evaluation Date: 10/13/22   FOTO Scores/Date: Goal - 68; 9/15/22 - 52   Visit Count 6/10 7/10 8/10 4/10 5/10   Manuals 9/27 9/29 10/4 9/21 9/22   R knee PROM all planes [facility] IBRAHIMA KD KD   Tibia-femoral distraction w/ medial opening [facility]  KD KD                           Ther Ex  10/   Bike 4 min half revolutions 4' 6 mins rocking 4 min half revolutions 4 min half revolutions   Heel slides with strap 10x5" then   10x actively 10x5" ea, PROM/AROM 5"x10 (91*) PROM/ 5"x10 AROM 10x5" (87*), then 10x actively 10x5" (87*), then 10x actively   Prone quad stretch 3x30"   30"x3 3x30"  3x30"   Wall slides 3 min   3 mins  3 min (89*) 3 min   HS curls Red 20x3" RTB 20x3" RTB 3"x20 Red 20x3"  Red 20x3"   Heel prop 5# 3 min  5# x 3 mins 5# 3 min 5# 3 min   Quad set 10x10"  10x10" On prop 10"x10 10x10" (-6*) 10x10"    LAQ 20x3"  20x3" 3# 5" iso 2x10 20x3" 20x3"   Standing TKE Blue 10x10"  LP 75# 2x10x5" BTB 10"x10 Blue 10x10"  Blue 10x10"   TRX  DL squat depth to tolerance 2x10   DL squat depth to tolerance 2x10 DL squat depth to tolerance 2x10 DL squat depth to tolerance 2x10   Leg press DL 95# 2x10  75# 2 up/1 down 2x10 SL 45# x15 DL 85# 2x10 DL 95# 2x10   Step ups 8" 3x10   8" 2x10 8" 2x10 8" 2x10    Blu step overs nv    12" hurdles 2 laps - extensive cues for gait mechanics   Standing calf Stretch    2 mins     Seated Flexion t-band Stretch   BTB x 3 mins     LS HS Stretch   x2 mins     HEP    Updated    Neuro Re-Ed                                                                                                                     Ther Act                                   Modalities

## 2022-10-06 ENCOUNTER — EVALUATION (OUTPATIENT)
Dept: PHYSICAL THERAPY | Facility: CLINIC | Age: 67
End: 2022-10-06
Payer: MEDICARE

## 2022-10-06 DIAGNOSIS — M25.561 CHRONIC PAIN OF RIGHT KNEE: Primary | ICD-10-CM

## 2022-10-06 DIAGNOSIS — G89.29 CHRONIC PAIN OF RIGHT KNEE: Primary | ICD-10-CM

## 2022-10-06 PROCEDURE — 97110 THERAPEUTIC EXERCISES: CPT

## 2022-10-06 PROCEDURE — 97140 MANUAL THERAPY 1/> REGIONS: CPT

## 2022-10-06 NOTE — PROGRESS NOTES
PT Re-Evaluation     Today's date: 10/6/2022  Patient name: Maribell Adams  : 1955  MRN: [id number]  Referring provider: Lis Frias MD  Dx:   Encounter Diagnosis     ICD-10-CM    1  Chronic pain of right knee  M25 561     G89 29        Start Time: 1015  Stop Time: 1114  Total time in clinic (min): 59 minutes    Assessment  Assessment details: 10/6 RE-EVAL: Pt reports 40-50% improvement over her course of care  Improved MMT all planes to WNL, improved FOTO score  AROM remains limited, but pt able to ambulate with noticeably improved gait quality  Subjective improvements in sleep, general mobility  She would benefit from continued PT to restore normal ROM, improve endurance/strength/SLS/gait/higher level functioning  PREVIOUS: Patient is a 78 y o  female who presents to physical therapy with c/o chronic right knee pain  Patient presents to evaluation with pain, decreased range of motion, decreased strength, decreased quality of gait/ambulatory status, and decreased tolerance to activity  Patient demonstrates excellent tolerance to treatment and was provided with a written copy of their initial home exercise program focusing on knee ROM and was encouraged to perform daily per tolerance  I discussed risks, benefits, and alternatives to treatment, and answered all patient questions to patient satisfaction  Patient presents with baseline FOTO score of 47 indicating limited tolerance/ability to complete ADLs  Patient is an appropriate candidate for skilled PT and would benefit from skilled PT services to address the aforementioned impairments, achieve goals, maximize function, and improve quality of life  Pt is in agreement with this plan      Patient Education: activity modifications as needed, pacing of activities, importance of HEP compliance, PT prognosis/POC    Impairments: abnormal gait, abnormal or restricted ROM, activity intolerance, impaired balance, impaired physical strength, lacks appropriate home exercise program and pain with function    Goals  ST weeks  Pt will demonstrate good understanding and compliance with HEP  MET  Pt will increase active right knee extension to 0* to normalize gait with TKE at IC phase of gait PROGRESSING  Pt will increase active right knee flexion to 120* to normalize gait and improve tolerance/ability to perform functional activities to include stairs and squatting PROGRESSING  Pt will decrease right knee pain to 3-4/10 with activity PROGRESSING    LT weeks  Pt will increase right hip/knee strength to 5-/5 to allow for improved ability to squat, negotiate stairs, and prolonged community ambulation MET  Pt will decrease right knee pain to 0-1/10 with activity PROGRESSING  Pt will demonstrate negative Elys test on right NOT TESTED  Pt will ambulate with least restrictive AD and normal gait mechanics PROGRESSING  Pt will exhibit proper squatting/lifting mechanics without reliance on external cues for correction of form PROGRESSING  Pt will be able to tolerate prolonged standing/walking activities > 30 minutes without provocation of knee pain  PROGRESSING  Pt will improve FOTO score to > or = to 68 to indicate improved functional abilities PROGRESSING      Plan  Plan details: 2-3x/wk  Patient would benefit from: skilled physical therapy  Planned modality interventions: cryotherapy and thermotherapy: hydrocollator packs  Planned therapy interventions: ADL training, manual therapy, neuromuscular re-education, patient education, self care, strengthening, stretching, therapeutic activities, therapeutic exercise, home exercise program, graded exercise, graded activity, gait training, functional ROM exercises, flexibility, body mechanics training and balance  Frequency: 3x week  Duration in weeks: 8  Plan of Care beginning date: 9/15/2022  Plan of Care expiration date: 11/10/2022  Treatment plan discussed with: patient        Subjective Evaluation    History of Present Illness  Mechanism of injury: Patient reports being between 40-50% improvement since beginning therapy  Reports decreased pain, improved sleeping, improved "freedom of movement"  Reports stiffness with increased period of time    Reports inability to go down stairs in reciprocal mannor  Pain  Current pain ratin  At best pain ratin  At worst pain ratin  Quality: sharp and dull ache      Diagnostic Tests  X-ray: abnormal  Patient Goals  Patient goals for therapy: decreased pain, improved balance, increased motion, increased strength, independence with ADLs/IADLs and return to sport/leisure activities          Objective     General Comments:      Knee Comments  Right knee AROM: 0-10-85                      (post manuals and stretchs)    Right knee MMT: Flexion 5/5 Extension 5/5    Right hip MMT: Flex 5/5 Abd 4/5 Extension 5/5    Gait Assessment: Pt ambulates unassisted improved antalgic pattern, maintains knee flexion throughout gait cycle,  No longer presents with circumduction of right leg during mid-swing      FOTO:62 (47 @ IE)                Diagnosis: R knee pain   Precautions: (-)   POC Expires: 11/10/22   Re-evaluation Date: 10/13/22   FOTO Scores/Date: Goal - 68; 9/15/22 - 47, 10/6/22-66   Visit Count 6/10 7/10 8/10 9/10 5/10   Manuals 9/27 9/29 10/4 10/6 9/22   R knee PROM all planes [facility] IBRAHIMA IBRAHIMA KD   Tibia-femoral distraction w/ medial opening [facility]   KD                           Ther Ex  10/4 10/6 9/22   Bike 4 min half revolutions 4' 6 mins rocking 6 min rocking 4 min half revolutions   Heel slides with strap 10x5" then   10x actively 10x5" ea, PROM/AROM 5"x10 (91*) PROM/ 5"x10 AROM 5"x10 PROM/ 5"x10 AROM 10x5" (87*), then 10x actively   Prone quad stretch 3x30"   30"x3 30"x3 3x30"   Wall slides 3 min   3 mins   3 min   HS curls Red 20x3" RTB 20x3" RTB 3"x20  Red 20x3"   Heel prop 5# 3 min  5# x 3 mins 5# x 3 mins 5# 3 min   Quad set 10x10"  10x10" On prop 10"x10 10'X10 10x10"    LAQ 20x3"  20x3" 3# 5" iso 2x10  20x3"   Standing TKE Blue 10x10"  LP 75# 2x10x5" BTB 10"x10 BTB 10"x10 Blue 10x10"   TRX  DL squat depth to tolerance 2x10   DL squat depth to tolerance 2x10 DBL squat depth to tolerance 2x10 DL squat depth to tolerance 2x10   Leg press DL 95# 2x10  75# 2 up/1 down 2x10 SL 45# x15  DL 95# 2x10   Step ups 8" 3x10   8" 2x10  8" 2x10    Blu step overs nv    12" hurdles 2 laps - extensive cues for gait mechanics   Standing calf Stretch    2 mins     Sit to Stand    Low mat x 10    Step Down    4"x10    Seated Flexion t-band Stretch   BTB x 3 mins     LS HS Stretch   x2 mins X 2 mins    FOTO    Administered performed, subjective objective data collected for Re-eval    Neuro Re-Ed                                                                                                                    Ther Act                                Modalities

## 2022-10-06 NOTE — TELEPHONE ENCOUNTER
Called patient to see if she heard from the [employer] regarding the brace, she has not    Aware we are looking into it and will be back in touch

## 2022-10-06 NOTE — TELEPHONE ENCOUNTER
If we do not hear from the supplier we can print the order for her to  and take to a medical supply company of her choice

## 2022-10-07 ENCOUNTER — OFFICE VISIT (OUTPATIENT)
Dept: PHYSICAL THERAPY | Facility: CLINIC | Age: 67
End: 2022-10-07
Payer: MEDICARE

## 2022-10-07 DIAGNOSIS — G89.29 CHRONIC PAIN OF RIGHT KNEE: Primary | ICD-10-CM

## 2022-10-07 DIAGNOSIS — M25.561 CHRONIC PAIN OF RIGHT KNEE: Primary | ICD-10-CM

## 2022-10-07 PROCEDURE — 97530 THERAPEUTIC ACTIVITIES: CPT | Performed by: PHYSICAL THERAPIST

## 2022-10-07 PROCEDURE — 97140 MANUAL THERAPY 1/> REGIONS: CPT | Performed by: PHYSICAL THERAPIST

## 2022-10-07 PROCEDURE — 97110 THERAPEUTIC EXERCISES: CPT | Performed by: PHYSICAL THERAPIST

## 2022-10-07 NOTE — PROGRESS NOTES
Daily Note     Today's date: 10/7/2022  Patient name: Michael Morales  : 1955  MRN: [id number]  Referring provider: Kay Brink MD  Dx:   Encounter Diagnosis     ICD-10-CM    1  Chronic pain of right knee  M25 561     G89 29        Start Time: 1130  Stop Time: 1230  Total time in clinic (min): 60 minutes    Subjective: Pt reports minimal pain currently, feels good improvement to this point in PT  Objective: See treatment diary below      Assessment: Pt continues to make steady gains with knee mobility post manuals and able to achieve 92* with AA wall slides and -7* of extension actively with more c/o end range stiffness versus pain  Pt was able to progress height with step ups with initial cues for avoidance of circumduction of right leg during step up but improves with repetition without onset of knee pain  Added TRX assisted static lunges with good tolerance but notable fatigue and stiffness noted at end ranges, cues for correction of form  Pt with significant improvement in quality of gait and pain post session  HEP was updated and reviewed  Plan: Continue per plan of care  Progress treatment as tolerated         Diagnosis: R knee pain   Precautions: (-)   POC Expires: 11/10/22   Re-evaluation Date: 11/10/22   FOTO Scores/Date: Goal - 68; 9/15/22 - 47, 10/6/22-66   Visit Count 6/10 7/10 8/10 1/10 2/10   Manuals 9/27 9/29 10/4 10/6 10/7   R knee PROM all planes [facility] IBRAHIMA BIRAHIMA KD   Tibia-femoral distraction w/ medial opening [facility]   KD                           Ther Ex 9/27 9/29 10/4 10/6 10/7   Bike 4 min half revolutions 4' 6 mins rocking 6 min rocking 6 min half revolutions   Heel slides with strap 10x5" then   10x actively 10x5" ea, PROM/AROM 5"x10 (91*) PROM/ 5"x10 AROM 5"x10 PROM/ 5"x10 AROM 10x5" (80*), then 10x actively   Prone quad stretch 3x30"   30"x3 30"x3 3x30"   Wall slides 3 min   3 mins   3 min (92*)   HS curls Red 20x3" RTB 20x3" RTB 3"x20  grn 20x3"   Heel prop 5# 3 min  5# x 3 mins 5# x 3 mins 5# 3 min   Quad set 10x10"  10x10" On prop 10"x10 10'X10 10x10" (-7*)   LAQ 20x3"  20x3" 3# 5" iso 2x10  20x3" 3#   Standing TKE Blue 10x10"  LP 75# 2x10x5" BTB 10"x10 BTB 10"x10 Blue 10x10"   TRX  DL squat depth to tolerance 2x10   DL squat depth to tolerance 2x10 DBL squat depth to tolerance 2x10 TA   Leg press DL 95# 2x10  75# 2 up/1 down 2x10 SL 45# x15  SL 45# 2x10   Step ups 8" 3x10   8" 2x10  TA   Blu step overs nv       Standing calf Stretch    2 mins     Sit to Stand    Low mat x 10    Step Down    4"x10 TA   Seated Flexion t-band Stretch   BTB x 3 mins     LS HS Stretch   x2 mins X 2 mins 2 min   FOTO    Administered performed, subjective objective data collected for Re-eval    Neuro Re-Ed                                                                                                                    Ther Act      10/7   TRX     DL squat depth to tolerance 2x10   Step ups     10" 2x10   Step downs     4" 10x, 6" 10x   Static lunges     W/ TRX 2x10 ea   Resisted fwd walking with TKE      15# 4 laps - cues for form              Modalities

## 2022-10-10 NOTE — TELEPHONE ENCOUNTER
S/w patient  She will call [facility] to see if they have the brace in stock, will call back with fax number  If her Insurance doesn't cover it or cheaper on [place] she may order from there    Hold for call back

## 2022-10-10 NOTE — TELEPHONE ENCOUNTER
[facility]-to provide patient [facility] medical supply number, [phone], for her to call them and inquire about this  knee brace    I can fax the written order

## 2022-10-11 ENCOUNTER — OFFICE VISIT (OUTPATIENT)
Dept: PHYSICAL THERAPY | Facility: CLINIC | Age: 67
End: 2022-10-11
Payer: MEDICARE

## 2022-10-11 DIAGNOSIS — G89.29 CHRONIC PAIN OF RIGHT KNEE: Primary | ICD-10-CM

## 2022-10-11 DIAGNOSIS — M25.561 CHRONIC PAIN OF RIGHT KNEE: Primary | ICD-10-CM

## 2022-10-11 PROCEDURE — 97140 MANUAL THERAPY 1/> REGIONS: CPT | Performed by: PHYSICAL THERAPIST

## 2022-10-11 PROCEDURE — 97110 THERAPEUTIC EXERCISES: CPT | Performed by: PHYSICAL THERAPIST

## 2022-10-11 PROCEDURE — 97530 THERAPEUTIC ACTIVITIES: CPT | Performed by: PHYSICAL THERAPIST

## 2022-10-11 NOTE — PROGRESS NOTES
Daily Note     Today's date: 10/11/2022  Patient name: Rosanna Tony  : 1955  MRN: [id number]  Referring provider: Gualberto Parisi MD  Dx:   Encounter Diagnosis     ICD-10-CM    1  Chronic pain of right knee  M25 561     G89 29        Start Time: 1015  Stop Time: 1110  Total time in clinic (min): 55 minutes    Subjective: Pt reports knee felt good but sore after last visit, denies any current pain  Pt reports having increased pain on  but unsure why  Objective: See treatment diary below      Assessment: Pt with slight regression in both A/PROM compared to last visit but overall still significantly improved from evaluation  Pt with significant improvement in quality of movement and less compensatory strategies during step ups with improved eccentric control and hinging of knee with minimal to no UE assist on handrail today  Pt remains reliant on cues during TRX assisted squats and lunges for correction of LE mechanics with continued modifications on depth to improve tolerance and form with some pain noted during static lunges with initial reps but improves with repetition  Pt reports improved pain/stiffness and quality of gait noted post session  Will continue to progress functional ROM, strength, and stability to allow return to PLOF  Plan: Continue per plan of care  Progress treatment as tolerated         Diagnosis: R knee pain   Precautions: (-)   POC Expires: 11/10/22   Re-evaluation Date: 11/10/22   FOTO Scores/Date: Goal - 68; 9/15/22 - 47, 10/6/22-66;    Visit Count 3/10 7/10 8/10 1/10 2/10   Manuals 10/11 9/29 10/4 10/6 10/7   R knee PROM all planes [facility] IBRAHIMA IBRAHIMA KD   Tibia-femoral distraction w/ medial opening [facility]   KD                           Ther Ex 10/11 9/29 10/4 10/6 10/7   Bike 5 min half revolutions 4' 6 mins rocking 6 min rocking 6 min half revolutions   Heel slides with strap 10x5" (87*), then 10x actively 10x5" ea, PROM/AROM 5"x10 (91*) PROM/ 5"x10 AROM 5"x10 PROM/ 5"x10 AROM 10x5" (80*), then 10x actively   Prone quad stretch 3x30"   30"x3 30"x3 3x30"   Wall slides 3 min (90*)  3 mins   3 min (92*)   HS curls grn 20x3" RTB 20x3" RTB 3"x20  grn 20x3"   Heel prop 5# 3 min  5# x 3 mins 5# x 3 mins 5# 3 min   Quad set 10x10" (-8*) 10x10" On prop 10"x10 10'X10 10x10" (-7*)   LAQ 20x3" 3# 20x3" 3# 5" iso 2x10  20x3" 3#   Standing TKE Blue 10x10"  LP 75# 2x10x5" BTB 10"x10 BTB 10"x10 Blue 10x10"   TRX  TA  DL squat depth to tolerance 2x10 DBL squat depth to tolerance 2x10 TA   Leg press SL 45# 2x10  75# 2 up/1 down 2x10 SL 45# x15  SL 45# 2x10   Step ups TA  8" 2x10  TA   Blu step overs        Standing calf Stretch    2 mins     Sit to Stand    Low mat x 10    Step Down    4"x10 TA   Seated Flexion t-band Stretch   BTB x 3 mins     LS HS Stretch 2 min  x2 mins X 2 mins 2 min   FOTO    Administered performed, subjective objective data collected for Re-eval    Neuro Re-Ed                                                                                                                    Ther Act 10/11     10/7   TRX DL squat depth to tolerance 2x10    DL squat depth to tolerance 2x10   Step ups 10" 2x10    10" 2x10   Step downs 4" 2x10    4" 10x, 6" 10x   Static lunges W/ TRX 2x10 ea    W/ TRX 2x10 ea   Resisted fwd walking with TKE 15# 4 laps - cues for form     15# 4 laps - cues for form              Modalities

## 2022-10-13 ENCOUNTER — OFFICE VISIT (OUTPATIENT)
Dept: PHYSICAL THERAPY | Facility: CLINIC | Age: 67
End: 2022-10-13
Payer: MEDICARE

## 2022-10-13 DIAGNOSIS — G89.29 CHRONIC PAIN OF RIGHT KNEE: Primary | ICD-10-CM

## 2022-10-13 DIAGNOSIS — M25.561 CHRONIC PAIN OF RIGHT KNEE: Primary | ICD-10-CM

## 2022-10-13 PROCEDURE — 97140 MANUAL THERAPY 1/> REGIONS: CPT | Performed by: PHYSICAL THERAPIST

## 2022-10-13 PROCEDURE — 97110 THERAPEUTIC EXERCISES: CPT | Performed by: PHYSICAL THERAPIST

## 2022-10-13 PROCEDURE — 97530 THERAPEUTIC ACTIVITIES: CPT | Performed by: PHYSICAL THERAPIST

## 2022-10-13 NOTE — PROGRESS NOTES
Daily Note     Today's date: 10/13/2022  Patient name: Dorien Felty  : 1955  MRN: [id number]  Referring provider: Fransisca Barker MD  Dx:   Encounter Diagnosis     ICD-10-CM    1  Chronic pain of right knee  M25 561     G89 29        Start Time: 1015  Stop Time: 1110  Total time in clinic (min): 55 minutes    Subjective: Pt reports knee is doing good today, felt good after last visit without increase in pain  Objective: See treatment diary below      Assessment: Pt demonstrates good improvement in A/PROM of knee today compared to last visit but remains limited with end range stiffness and slight guarding  Pt appears to be hitting a plateau with ROM gains and decreased reducing frequency of PT visits to 2x/wk to further focus on progressing functional strength/stability to allow improved tolerance to ADLs and recreational activities  Pt was able to introduce [place] squats with overall good tolerance and minimal pain but extensive cues required for correction of knee mechanics and to bias gluteal muscularity by sitting back on heels as she tends to distribute weight through forefoot resulting in increase in knee pain but improved with adjusting form  Pt remains with slight discomfort with static lunges and step ups but improves with repetition, more discomfort on right knee during final portion of eccentric phase with step ups  Pt encouraged to continue with home exercises daily as symptoms allow and will progress as able next visit  Plan: Continue per plan of care  Progress treatment as tolerated         Diagnosis: R knee pain   Precautions: (-)   POC Expires: 11/10/22   Re-evaluation Date: 11/10/22   FOTO Scores/Date: Goal - 68; 9/15/22 - 47, 10/6/22-66;    Visit Count 3/10 4/10 8/10 1/10 2/10   Manuals 10/11 10/13 10/4 10/6 10/7   R knee PROM all planes KD KD IBRAHIMA IBRAHIMA KD   Tibia-femoral distraction w/ medial opening KD    KD                           Ther Ex 10/11 10/13 10/4 10/6 10/7   Bike 5 min half revolutions 5 min half revolutions 6 mins rocking 6 min rocking 6 min half revolutions   Heel slides with strap 10x5" (87*), then 10x actively 10x5" (92*), then 10x actively 5"x10 (91*) PROM/ 5"x10 AROM 5"x10 PROM/ 5"x10 AROM 10x5" (80*), then 10x actively   Prone quad stretch 3x30"  3x30" 30"x3 30"x3 3x30"   Wall slides 3 min (90*) 3 min (92*) 3 mins   3 min (92*)   HS curls grn 20x3" grn 20x3" RTB 3"x20  grn 20x3"   Heel prop 5# 3 min 5# 3 min 5# x 3 mins 5# x 3 mins 5# 3 min   Quad set 10x10" (-8*) 10x10" (-7*) On prop 10"x10 10'X10 10x10" (-7*)   LAQ 20x3" 3# 20x3" 3# 3# 5" iso 2x10  20x3" 3#   Standing TKE Blue 10x10"  Blue 10x10"  BTB 10"x10 BTB 10"x10 Blue 10x10"   Leg press SL 45# 2x10  SL 45# 2x10  SL 45# x15  SL 45# 2x10   Blu step overs        Sit to Stand    Low mat x 10    Step Down    4"x10 TA   Seated Flexion t-band Stretch   BTB x 3 mins     LS HS Stretch 2 min 2 min x2 mins X 2 mins 2 min           FOTO    Administered performed, subjective objective data collected for Re-eval    Neuro Re-Ed                                                                                                                    Ther Act 10/11 10/13    10/7   TRX DL squat depth to tolerance 2x10 DL squat depth to tolerance 2x10   DL squat depth to tolerance 2x10   Step ups 10" 2x10 10" 2x10    10" 2x10   Step downs 4" 2x10 4" 10x   4" 10x, 6" 10x   Static lunges W/ TRX 2x10 ea W/ TRX 2x10 ea   W/ TRX 2x10 ea   Resisted fwd walking with TKE 15# 4 laps - cues for form 15# 4 laps - cues for form    15# 4 laps - cues for form   Bulagarian squat  10x crutch for UE assist        Modalities

## 2022-10-14 ENCOUNTER — OFFICE VISIT (OUTPATIENT)
Dept: PHYSICAL THERAPY | Facility: CLINIC | Age: 67
End: 2022-10-14
Payer: MEDICARE

## 2022-10-14 ENCOUNTER — TELEPHONE (OUTPATIENT)
Dept: FAMILY MEDICINE CLINIC | Facility: MEDICAL CENTER | Age: 67
End: 2022-10-14

## 2022-10-14 DIAGNOSIS — G89.29 CHRONIC PAIN OF RIGHT KNEE: Primary | ICD-10-CM

## 2022-10-14 DIAGNOSIS — M25.561 CHRONIC PAIN OF RIGHT KNEE: Primary | ICD-10-CM

## 2022-10-14 PROCEDURE — 97110 THERAPEUTIC EXERCISES: CPT | Performed by: PHYSICAL THERAPIST

## 2022-10-14 PROCEDURE — 97140 MANUAL THERAPY 1/> REGIONS: CPT | Performed by: PHYSICAL THERAPIST

## 2022-10-14 PROCEDURE — 97530 THERAPEUTIC ACTIVITIES: CPT | Performed by: PHYSICAL THERAPIST

## 2022-10-14 NOTE — TELEPHONE ENCOUNTER
Chart notes for 9/6/2022 printed and faxed to [facility] attention as requested

## 2022-10-14 NOTE — PROGRESS NOTES
Daily Note     Today's date: 10/14/2022  Patient name: Rocio Madden  : 1955  MRN: [id number]  Referring provider: Parvin Manjarrez MD  Dx:   Encounter Diagnosis     ICD-10-CM    1  Chronic pain of right knee  M25 561     G89 29        Start Time: 1145  Stop Time: 0107  Total time in clinic (min): 60 minutes    Subjective: Pt denies any pain after last visit, feels good today without any c/o pain  Objective: See treatment diary below      Assessment: Pt with good improvement in both A/PROM knee flexion and able to achieve best measurements of 93* actively and 95* active-assist, as well as 100* with manual OP today  Pt with much improved fluidity with step ups, static lunges, and Spanish squats without end range pain noted today  Transitioned from TRX assisted DL squats to unassisted goblet squats depth to tolerance with increased challenge and intermittent cues to avoid weight shifting to left at bottom portion of squat  Pt demonstrates good carry over of knee extension ROM but limited progress with extension over past week or so and remains limited at -7*  Pt to decrease frequency of PT to 2x/wk starting next week as she is hitting a plateau with mobility and will continue to progress functional ROM, strength, and stability  HEP was updated and reviewed  Plan: Continue per plan of care  Progress treatment as tolerated         Diagnosis: R knee pain   Precautions: (-)   POC Expires: 11/10/22   Re-evaluation Date: 11/10/22   FOTO Scores/Date: Goal - 68; 9/15/22 - 47, 10/6/22-66;    Visit Count 3/10 4/10 5/10 1/10 2/10   Manuals 10/11 10/13 10/14 10/6 10/7   R knee PROM all planes KD KD KD IBRAHIMA KD   Tibia-femoral distraction w/ medial opening KD    KD                           Ther Ex 10/11 10/13 10/14 10/6 10/7   Bike 5 min half revolutions 5 min half revolutions 6 mins rocking 6 min rocking 6 min half revolutions   Heel slides with strap 10x5" (87*), then 10x actively 10x5" (92*), then 10x actively 5"x10 (95*) PROM/ 5"x10 AROM (93* active) 5"x10 PROM/ 5"x10 AROM 10x5" (80*), then 10x actively   Prone quad stretch 3x30"  3x30" 30"x3 30"x3 3x30"   Wall slides 3 min (90*) 3 min (92*) 3 mins (95*, 100* with OP)  3 min (92*)   HS curls grn 20x3" grn 20x3" BTB 3"x20  grn 20x3"   Heel prop 5# 3 min 5# 3 min 5# x 3 mins 5# x 3 mins 5# 3 min   Quad set 10x10" (-8*) 10x10" (-7*) 10"x10 (*-7) 10'X10 10x10" (-7*)   LAQ 20x3" 3# 20x3" 3# 4# 5" iso 2x10  20x3" 3#   Standing TKE Blue 10x10"  Blue 10x10"  BTB 10"x10 BTB 10"x10 Blue 10x10"   Leg press SL 45# 2x10  SL 45# 2x10  SL 45# 2x10  SL 45# 2x10   Blu step overs        Seated Flexion t-band Stretch        LS HS Stretch 2 min 2 min x2 mins X 2 mins 2 min           FOTO    Administered performed, subjective objective data collected for Re-eval    Neuro Re-Ed                                                                                                                    Ther Act 10/11 10/13  10/14  10/7   TRX DL squat depth to tolerance 2x10 DL squat depth to tolerance 2x10 Goblet squat depth to tolerance no TRX 2x10  DL squat depth to tolerance 2x10   Step ups 10" 2x10 10" 2x10  10" 2x10  10" 2x10   Step downs 4" 2x10 4" 10x 4" 10x  4" 10x, 6" 10x   Static lunges W/ TRX 2x10 ea W/ TRX 2x10 ea W/ TRX 2x10 ea  W/ TRX 2x10 ea   Resisted fwd walking with TKE 15# 4 laps - cues for form 15# 4 laps - cues for form 15# 4 laps - cues for form  15# 4 laps - cues for form   Bulagarian squat  10x crutch for UE assist 2x10 crutch for UE assist      Modalities

## 2022-10-14 NOTE — TELEPHONE ENCOUNTER
Max Souza from [facility] called to request a corrected order for pt's knee brace  It needs an updated diagnosis code  Max Souza also requested a copy of the pt's chart notes from that visit  Please fax the notes to her attention at [phone]  Routed to Clinical - are you able to update the dx code?

## 2022-10-17 ENCOUNTER — TELEPHONE (OUTPATIENT)
Dept: FAMILY MEDICINE CLINIC | Facility: MEDICAL CENTER | Age: 67
End: 2022-10-17

## 2022-10-17 NOTE — TELEPHONE ENCOUNTER
[phone] Devonte at [facility] for PT to call back  Need a new dx code to get the knee brace approved  Pain dx codes are not covered by insurance

## 2022-10-18 ENCOUNTER — OFFICE VISIT (OUTPATIENT)
Dept: PHYSICAL THERAPY | Facility: CLINIC | Age: 67
End: 2022-10-18
Payer: MEDICARE

## 2022-10-18 DIAGNOSIS — G89.29 CHRONIC PAIN OF RIGHT KNEE: Primary | ICD-10-CM

## 2022-10-18 DIAGNOSIS — M25.561 CHRONIC PAIN OF RIGHT KNEE: Primary | ICD-10-CM

## 2022-10-18 PROCEDURE — 97110 THERAPEUTIC EXERCISES: CPT

## 2022-10-18 PROCEDURE — 97140 MANUAL THERAPY 1/> REGIONS: CPT

## 2022-10-18 PROCEDURE — 97530 THERAPEUTIC ACTIVITIES: CPT

## 2022-10-18 NOTE — PROGRESS NOTES
Daily Note     Today's date: 10/18/2022  Patient name: Jeana Fatima  : 1955  MRN: [id number]  Referring provider: Barry Bautista MD  Dx:   Encounter Diagnosis     ICD-10-CM    1  Chronic pain of right knee  M25 561     G89 29        Start Time: 1501  Stop Time: 1625  Total time in clinic (min): 84 minutes    Subjective:  Patient reports overall improvement  Reports improved mobility and walking mechanics  Reports stiffness at night      Objective: See treatment diary below      Assessment:  Patient's ROM measures 0-6-100 post manual interventions and stretch  Utilized long duration stretches in both flexion and ext  Heavy cues with [place] squats and static lunges address excessive fwd knee translation with R in rear position  Utilized both visual and tactile feedback with patient's mechanics improving with repetition  Plan: Continue per plan of care  Progress treatment as tolerated         Diagnosis: R knee pain   Precautions: (-)   POC Expires: 11/10/22   Re-evaluation Date: 11/10/22   FOTO Scores/Date: Goal - 68; 9/15/22 - 47, 10/6/22-66;    Visit Count 3/10 4/10 5/10 6/10 2/10   Manuals 10/11 10/13 10/14 10/18 10/7   R knee PROM all planes KD KD KD  KD   Tibia-femoral distraction w/ medial opening KD    KD                           Ther Ex 10/11 10/13 10/14 10/18 10/7   Bike 5 min half revolutions 5 min half revolutions 6 mins rocking 6 mins rocking  6 min half revolutions   Heel slides with strap 10x5" (87*), then 10x actively 10x5" (92*), then 10x actively 5"x10 (95*) PROM/ 5"x10 AROM (93* active) 5"x10 (95*) PROM/ 5"x10 (100*) AROM 10x5" (80*), then 10x actively   Prone quad stretch 3x30"  3x30" 30"x3 30"x3 3x30"   Wall slides 3 min (90*) 3 min (92*) 3 mins (95*, 100* with OP) 3 min (95*) 3 min (92*)   HS curls grn 20x3" grn 20x3" BTB 3"x20 BTB 3"x20 grn 20x3"   Heel prop 5# 3 min 5# 3 min 5# x 3 mins 7 5# x 3 mins 5# 3 min   Quad set 10x10" (-8*) 10x10" (-7*) 10"x10 (*-7) 10"x10 (-6) 10x10" (-7*)   LAQ 20x3" 3# 20x3" 3# 4# 5" iso 2x10 4# 5" 2x10 20x3" 3#   Standing TKE Blue 10x10"  Blue 10x10"  BTB 10"x10 BTB 10"x10 Blue 10x10"   Leg press SL 45# 2x10  SL 45# 2x10  SL 45# 2x10 SL 45# 2x10 SL 45# 2x10   Goblet Squat    2x10 depth to tolerance    Seated Flexion t-band Stretch    BTB x 3 mins    LS HS Stretch 2 min 2 min x2 mins x2 mins 2 min           FOTO        Neuro Re-Ed                                         Ther Act 10/11 10/13  10/14 10/18 10/7   TRX DL squat depth to tolerance 2x10 DL squat depth to tolerance 2x10 Goblet squat depth to tolerance no TRX 2x10  DL squat depth to tolerance 2x10   Step ups 10" 2x10 10" 2x10  10" 2x10  10" 2x10   Step downs 4" 2x10 4" 10x 4" 10x 4"x10 6"x10 4" 10x, 6" 10x   Static lunges W/ TRX 2x10 ea W/ TRX 2x10 ea W/ TRX 2x10 ea W/TRX 2x10 ea W/ TRX 2x10 ea   Resisted fwd walking with TKE 15# 4 laps - cues for form 15# 4 laps - cues for form 15# 4 laps - cues for form  15# 4 laps - cues for form   Bulagarian squat  10x crutch for UE assist 2x10 crutch for UE assist 2x10 w/crutch for UE assist (off 6" plyo)     Modalities

## 2022-10-18 NOTE — TELEPHONE ENCOUNTER
No return call from Physical therapist , called patient to inform her the insurance is not approving the brace with the current Dx  How is she feeling ?

## 2022-10-19 NOTE — TELEPHONE ENCOUNTER
Malissa Lesches from PT called back, he said we can use osteoarthritis of the right knee for a diagnosis  If its not covered, the patient is willing to pay out of pocket

## 2022-10-20 ENCOUNTER — OFFICE VISIT (OUTPATIENT)
Dept: PHYSICAL THERAPY | Facility: CLINIC | Age: 67
End: 2022-10-20
Payer: MEDICARE

## 2022-10-20 DIAGNOSIS — M25.561 CHRONIC PAIN OF RIGHT KNEE: Primary | ICD-10-CM

## 2022-10-20 DIAGNOSIS — G89.29 CHRONIC PAIN OF RIGHT KNEE: Primary | ICD-10-CM

## 2022-10-20 PROCEDURE — 97140 MANUAL THERAPY 1/> REGIONS: CPT

## 2022-10-20 PROCEDURE — 97530 THERAPEUTIC ACTIVITIES: CPT

## 2022-10-20 PROCEDURE — 97110 THERAPEUTIC EXERCISES: CPT

## 2022-10-20 NOTE — PROGRESS NOTES
Daily Note     Today's date: 10/20/2022  Patient name: Delvis Engle  : 1955  MRN: [id number]  Referring provider: Qing Clayton MD  Dx:   Encounter Diagnosis     ICD-10-CM    1  Chronic pain of right knee  M25 561     G89 29        Start Time: 1201  Stop Time: 1315  Total time in clinic (min): 74 minutes    Subjective: patient reports no new complaints or incidents  Denies any pain post last therapy session  Objective: See treatment diary below      Assessment:  Patient demonstrates improved lunging and Dutch squat mechanics with R LE in rear position when compared to previous therapy session  Required less cues on addressing excessive fwd translation of L LE and front foot weight distribution  Small cues with step ups required addressing hip circumduction with patient being able to self correct with repetition  Plan: Continue per plan of care  Progress treatment as tolerated         Diagnosis: R knee pain   Precautions: (-)   POC Expires: 11/10/22   Re-evaluation Date: 11/10/22   FOTO Scores/Date: Goal - 68; 9/15/22 - 47, 10/6/22-66;    Visit Count 3/10 4/10 5/10 6/10 7/10   Manuals 10/11 10/13 10/14 10/18 10/20   R knee PROM all planes KD KD KD IBRAHIMA IBRAHIMA   Tibia-femoral distraction w/ medial opening KD                               Ther Ex 10/11 10/13 10/14 10/18 10/20   Bike 5 min half revolutions 5 min half revolutions 6 mins rocking 6 mins rocking  6 min rocking   Heel slides with strap 10x5" (87*), then 10x actively 10x5" (92*), then 10x actively 5"x10 (95*) PROM/ 5"x10 AROM (93* active) 5"x10 (95*) PROM/ 5"x10 (100*) AROM    Prone quad stretch 3x30"  3x30" 30"x3 30"x3 1 min x3   Wall slides 3 min (90*) 3 min (92*) 3 mins (95*, 100* with OP) 3 min (95*)    HS curls grn 20x3" grn 20x3" BTB 3"x20 BTB 3"x20 BTB 3" x20   Heel prop 5# 3 min 5# 3 min 5# x 3 mins 7 5# x 3 mins 7 5# x 3 mins   Quad set 10x10" (-8*) 10x10" (-7*) 10"x10 (*-7) 10"x10 (-6)    LAQ 20x3" 3# 20x3" 3# 4# 5" iso 2x10 4# 5" 2x10 4#  5" 2x10   Standing TKE Blue 10x10"  Blue 10x10"  BTB 10"x10 BTB 10"x10 BTB 10"x10   Leg press SL 45# 2x10  SL 45# 2x10  SL 45# 2x10 SL 45# 2x10 SL 45# 2x10   Goblet Squat    2x10 depth to tolerance 2x10 depth to tolerance   Seated Flexion t-band Stretch    BTB x 3 mins BTB x 3 mins   LS HS Stretch 2 min 2 min x2 mins x2 mins            FOTO        Neuro Re-Ed                                         Ther Act 10/11 10/13  10/14 10/18 10/20   TRX DL squat depth to tolerance 2x10 DL squat depth to tolerance 2x10 Goblet squat depth to tolerance no TRX 2x10     Step ups 10" 2x10 10" 2x10  10" 2x10  10" 2x10   Step downs 4" 2x10 4" 10x 4" 10x 4"x10 6"x10 6"x20   Static lunges W/ TRX 2x10 ea W/ TRX 2x10 ea W/ TRX 2x10 ea W/TRX 2x10 ea W/TRX 2x10   Resisted fwd walking with TKE 15# 4 laps - cues for form 15# 4 laps - cues for form 15# 4 laps - cues for form  15# x 7   Bulagarian squat  10x crutch for UE assist 2x10 crutch for UE assist 2x10 w/crutch for UE assist (off 6" plyo) 2 x10 w/crutch for UE assist (off 6"plyo)   Modalities

## 2022-10-20 NOTE — PROGRESS NOTES
Daily Note     Today's date: 10/20/2022  Patient name: Doreen Franco  : 1955  MRN: [id number]  Referring provider: Erick Arevalo MD  Dx:   Encounter Diagnosis     ICD-10-CM    1  Chronic pain of right knee  M25 561     G89 29                   Subjective: patient reports no new complaints or incidents  Denies any pain post last therapy session  Objective: See treatment diary below      Assessment:     Plan: Continue per plan of care  Progress treatment as tolerated         Diagnosis: R knee pain   Precautions: (-)   POC Expires: 11/10/22   Re-evaluation Date: 11/10/22   FOTO Scores/Date: Goal - 68; 9/15/22 - 47, 10/6/22-66;    Visit Count 3/10 4/10 5/10 6/10 2/10   Manuals 10/11 10/13 10/14 10/18 10/7   R knee PROM all planes KD KD KD  KD   Tibia-femoral distraction w/ medial opening KD    KD                           Ther Ex 10/11 10/13 10/14 10/18 10/7   Bike 5 min half revolutions 5 min half revolutions 6 mins rocking 6 mins rocking  6 min half revolutions   Heel slides with strap 10x5" (87*), then 10x actively 10x5" (92*), then 10x actively 5"x10 (95*) PROM/ 5"x10 AROM (93* active) 5"x10 (95*) PROM/ 5"x10 (100*) AROM 10x5" (80*), then 10x actively   Prone quad stretch 3x30"  3x30" 30"x3 30"x3 3x30"   Wall slides 3 min (90*) 3 min (92*) 3 mins (95*, 100* with OP) 3 min (95*) 3 min (92*)   HS curls grn 20x3" grn 20x3" BTB 3"x20 BTB 3"x20 grn 20x3"   Heel prop 5# 3 min 5# 3 min 5# x 3 mins 7 5# x 3 mins 5# 3 min   Quad set 10x10" (-8*) 10x10" (-7*) 10"x10 (*-7) 10"x10 (-6) 10x10" (-7*)   LAQ 20x3" 3# 20x3" 3# 4# 5" iso 2x10 4# 5" 2x10 20x3" 3#   Standing TKE Blue 10x10"  Blue 10x10"  BTB 10"x10 BTB 10"x10 Blue 10x10"   Leg press SL 45# 2x10  SL 45# 2x10  SL 45# 2x10 SL 45# 2x10 SL 45# 2x10   Goblet Squat    2x10 depth to tolerance    Seated Flexion t-band Stretch    BTB x 3 mins    LS HS Stretch 2 min 2 min x2 mins x2 mins 2 min           FOTO        Neuro Re-Ed Ther Act 10/11 10/13  10/14 10/18 10/7   TRX DL squat depth to tolerance 2x10 DL squat depth to tolerance 2x10 Goblet squat depth to tolerance no TRX 2x10  DL squat depth to tolerance 2x10   Step ups 10" 2x10 10" 2x10  10" 2x10  10" 2x10   Step downs 4" 2x10 4" 10x 4" 10x 4"x10 6"x10 4" 10x, 6" 10x   Static lunges W/ TRX 2x10 ea W/ TRX 2x10 ea W/ TRX 2x10 ea W/TRX 2x10 ea W/ TRX 2x10 ea   Resisted fwd walking with TKE 15# 4 laps - cues for form 15# 4 laps - cues for form 15# 4 laps - cues for form  15# 4 laps - cues for form   Bulagarian squat  10x crutch for UE assist 2x10 crutch for UE assist 2x10 w/crutch for UE assist (off 6" plyo)     Modalities

## 2022-10-25 ENCOUNTER — OFFICE VISIT (OUTPATIENT)
Dept: PHYSICAL THERAPY | Facility: CLINIC | Age: 67
End: 2022-10-25
Payer: MEDICARE

## 2022-10-25 DIAGNOSIS — M25.561 CHRONIC PAIN OF RIGHT KNEE: Primary | ICD-10-CM

## 2022-10-25 DIAGNOSIS — G89.29 CHRONIC PAIN OF RIGHT KNEE: Primary | ICD-10-CM

## 2022-10-25 PROCEDURE — 97530 THERAPEUTIC ACTIVITIES: CPT

## 2022-10-25 PROCEDURE — 97110 THERAPEUTIC EXERCISES: CPT

## 2022-10-25 PROCEDURE — 97140 MANUAL THERAPY 1/> REGIONS: CPT

## 2022-10-25 NOTE — PROGRESS NOTES
Daily Note     Today's date: 10/25/2022  Patient name: Danya Gómez  : 1955  MRN: [id number]  Referring provider: Irais Mejia MD  Dx:   Encounter Diagnosis     ICD-10-CM    1  Chronic pain of right knee  M25 561     G89 29        Start Time: 1415  Stop Time: 1532  Total time in clinic (min): 77 minutes    Subjective: patient reports no new complaints or incidents  Reports continued difficulty with going down stairs  Denies any pain associated with last therapy session      Objective: See treatment diary below      Assessment: patient AROM measures 0-7-97* post manual interventions and stretches  Notable muscle guarding and spasms present with therapist performed long duration prone knee flexion stretch that improved with performance of foam rolling to muscularity  Patient was able to reciprocally ascend and descend stairs with use of UE rail support  Cueing on compensatory hip rotation required  Plan: Continue per plan of care  Progress treatment as tolerated         Diagnosis: R knee pain   Precautions: (-)   POC Expires: 11/10/22   Re-evaluation Date: 11/10/22   FOTO Scores/Date: Goal - 68; 9/15/22 - 47, 10/6/22-66;    Visit Count 8/10 4/10 5/10 610 710   Manuals 10/25 10/13 10/14 10/18 10/20   R knee PROM all planes IBRAHIMA KD KD IBRAHIMA IBRAHIMA   Tibia-femoral distraction w/ medial opening                                Ther Ex 10/25 10/13 10/14 10/18 10/20   Bike 6 mins rocking 5 min half revolutions 6 mins rocking 6 mins rocking  6 min rocking   Heel slides with strap 10"x10 (97*) 10x5" (92*), then 10x actively 5"x10 (95*) PROM/ 5"x10 AROM (93* active) 5"x10 (95*) PROM/ 5"x10 (100*) AROM    Prone quad stretch 1 min x 3 3x30" 30"x3 30"x3 1 min x3   Wall slides  3 min (92*) 3 mins (95*, 100* with OP) 3 min (95*)    HS curls BTB 5"x20 grn 20x3" BTB 3"x20 BTB 3"x20 BTB 3" x20   Heel prop 7 5# x3 mins 5# 3 min 5# x 3 mins 7 5# x 3 mins 7 5# x 3 mins   Quad set (-7) 10x10" (-7*) 10"x10 (*-7) 10"x10 (-6) LAQ  20x3" 3# 4# 5" iso 2x10 4# 5" 2x10 4#  5" 2x10   Standing TKE BTB 10"x10 Blue 10x10"  BTB 10"x10 BTB 10"x10 BTB 10"x10   Leg press  SL 45# 2x10  SL 45# 2x10 SL 45# 2x10 SL 45# 2x10   Goblet Squat 20# 2x10 depth to tolerance   2x10 depth to tolerance 2x10 depth to tolerance   Seated Flexion t-band Stretch BTB x 3 mins    BTB x 3 mins BTB x 3 mins   LS HS Stretch x2 mins 2 min x2 mins x2 mins X 2 mins           FOTO        Neuro Re-Ed                                         Ther Act 10/25 10/13  10/14 10/18 10/20   TRX  DL squat depth to tolerance 2x10 Goblet squat depth to tolerance no TRX 2x10     Step ups 10"x20 10" 2x10  10" 2x10  10" 2x10   Step downs Up and over 8"10/Reciprocal climbing x 5 laps 4" 10x 4" 10x 4"x10 6"x10 6"x20   Static lunges  W/ TRX 2x10 ea W/ TRX 2x10 ea W/TRX 2x10 ea W/TRX 2x10   Resisted fwd walking with TKE  15# 4 laps - cues for form 15# 4 laps - cues for form  15# x 7   Bulagarian squat  10x crutch for UE assist 2x10 crutch for UE assist 2x10 w/crutch for UE assist (off 6" plyo) 2 x10 w/crutch for UE assist (off 6"plyo)   Modalities

## 2022-10-27 ENCOUNTER — OFFICE VISIT (OUTPATIENT)
Dept: PHYSICAL THERAPY | Facility: CLINIC | Age: 67
End: 2022-10-27
Payer: MEDICARE

## 2022-10-27 DIAGNOSIS — M25.561 CHRONIC PAIN OF RIGHT KNEE: Primary | ICD-10-CM

## 2022-10-27 DIAGNOSIS — G89.29 CHRONIC PAIN OF RIGHT KNEE: Primary | ICD-10-CM

## 2022-10-27 PROCEDURE — 97112 NEUROMUSCULAR REEDUCATION: CPT

## 2022-10-27 PROCEDURE — 97530 THERAPEUTIC ACTIVITIES: CPT

## 2022-10-27 PROCEDURE — 97140 MANUAL THERAPY 1/> REGIONS: CPT

## 2022-10-27 PROCEDURE — 97110 THERAPEUTIC EXERCISES: CPT

## 2022-10-27 NOTE — PROGRESS NOTES
Daily Note     Today's date: 10/27/2022  Patient name: Sonia Paz  : 1955  MRN: [id number]  Referring provider: Ramsey Maciel MD  Dx:   Encounter Diagnosis     ICD-10-CM    1  Chronic pain of right knee  M25 561     G89 29        Start Time: 1204  Stop Time: 1314  Total time in clinic (min): 70 minutes    Subjective:  Patient reports no new complaints or incidents  Denies any pain post last therapy session      Objective: See treatment diary below      Assessment:  Patient demonstrates improved lunging and squatting mechanics including use of available R knee ROM requiring less frequent cues  Was able to progress in knee stabilization and balance holding SLS for 15 seconds and performing with reaching out of COG      Plan: Continue per plan of care  Progress treatment as tolerated         Diagnosis: R knee pain   Precautions: (-)   POC Expires: 11/10/22   Re-evaluation Date: 11/10/22   FOTO Scores/Date: Goal - 68; 9/15/22 - 47, 10/6/22-66;    Visit Count 8/10 9/10 5/10 6/10 7/10   Manuals 10/25 10/27 10/14 10/18 10/20   R knee PROM all planes IBRAHIMA  KD IBRAHIMA IBRAHIMA   Tibia-femoral distraction w/ medial opening                                Ther Ex 10/25 10/27 10/14 10/18 10/20   Bike 6 mins rocking 6 mins rocking  6 mins rocking 6 mins rocking  6 min rocking   Heel slides with strap 10"x10 (97*) 10"x10 5"x10 (95*) PROM/ 5"x10 AROM (93* active) 5"x10 (95*) PROM/ 5"x10 (100*) AROM    Prone quad stretch 1 min x 3 1 min x 3 30"x3 30"x3 1 min x3   Wall slides   3 mins (95*, 100* with OP) 3 min (95*)    HS curls BTB 5"x20 BTB 5"x20 BTB 3"x20 BTB 3"x20 BTB 3" x20   Heel prop 7 5# x3 mins 7 5# x 3 mins 5# x 3 mins 7 5# x 3 mins 7 5# x 3 mins   Quad set (-7)  10"x10 (*-7) 10"x10 (-6)    LAQ   4# 5" iso 2x10 4# 5" 2x10 4#  5" 2x10   Standing TKE BTB 10"x10  BTB 10"x10 BTB 10"x10 BTB 10"x10   Leg press  SL 55# 2x10 SL 45# 2x10 SL 45# 2x10 SL 45# 2x10   Goblet Squat 20# 2x10 depth to tolerance   2x10 depth to tolerance 2x10 depth to tolerance   Seated Flexion t-band Stretch BTB x 3 mins  BTB x 3 mins  BTB x 3 mins BTB x 3 mins   LS HS Stretch x2 mins x2 mins x2 mins x2 mins X 2 mins           FOTO        Neuro Re-Ed  10/27      SLS  15"x5       SLS [employer] reaching   High mat 3 cones x 3                       Ther Act 10/25 10/27  10/14 10/18 10/20   TRX   Goblet squat depth to tolerance no TRX 2x10     Step ups 10"x20  10" 2x10  10" 2x10   Step downs Up and over 8"10/Reciprocal climbing x 5 laps  4" 10x 4"x10 6"x10 6"x20   Static lunges  @railing 2x10 W/ TRX 2x10 ea W/TRX 2x10 ea W/TRX 2x10   Resisted fwd walking with TKE   15# 4 laps - cues for form  15# x 7   Bulagarian squat  2x10 w/crutch 2x10 2x10 crutch for UE assist 2x10 w/crutch for UE assist (off 6" plyo) 2 x10 w/crutch for UE assist (off 6"plyo)   Modalities

## 2022-11-01 ENCOUNTER — EVALUATION (OUTPATIENT)
Dept: PHYSICAL THERAPY | Facility: CLINIC | Age: 67
End: 2022-11-01

## 2022-11-01 DIAGNOSIS — G89.29 CHRONIC PAIN OF RIGHT KNEE: Primary | ICD-10-CM

## 2022-11-01 DIAGNOSIS — M25.561 CHRONIC PAIN OF RIGHT KNEE: Primary | ICD-10-CM

## 2022-11-01 NOTE — PROGRESS NOTES
PT Re-Evaluation     Today's date: 2022  Patient name: Alexandra Suero  : 1955  MRN: [id number]  Referring provider: Dorian Church MD  Dx:   Encounter Diagnosis     ICD-10-CM    1  Chronic pain of right knee  M25 561     G89 29        Start Time: 930  Stop Time: 1000  Total time in clinic (min): 30 minutes    Assessment  Assessment details: Patient is a 78 y o  female with c/o chronic right knee pain and has completed 18 visits to date with improved FOTO score of 47 to 69 since initial evaluation  Patient demonstrates notable subjective/objective improvement since enrolling in PT to include improved pain, ROM, strength, stability, and ambulatory status  Patient continues to exhibit lingering deficits to include end range knee flexion/extension, pain, decreased closed chain strength, quality of gait, and activity tolerance that continues to impact tolerance/ability to perform ADLs and recreational activities  Discussed with patient regarding benefits of static stretching home device to improve knee extension mobility and supplement her PT and would like PT to reach out to [facility] to initiate this process  Patient will benefit from continued skilled PT intervention to address the aforementioned impairments, achieve goals, maximize function, and improve quality of life  Pt is in agreement with this plan    Impairments: abnormal gait, abnormal or restricted ROM, activity intolerance, impaired balance, impaired physical strength, lacks appropriate home exercise program and pain with function    Goals  ST weeks  Pt will demonstrate good understanding and compliance with HEP  MET  Pt will increase active right knee extension to 0* to normalize gait with TKE at IC phase of gait PROGRESSING  Pt will increase active right knee flexion to 120* to normalize gait and improve tolerance/ability to perform functional activities to include stairs and squatting PROGRESSING  Pt will decrease right knee pain to 3-4/10 with activity MET    LT weeks  Pt will increase right hip/knee strength to 5-/5 to allow for improved ability to squat, negotiate stairs, and prolonged community ambulation MET  Pt will decrease right knee pain to 0-1/10 with activity PROGRESSING  Pt will demonstrate negative Elys test on right PROGRESSING  Pt will ambulate with least restrictive AD and normal gait mechanics PROGRESSING  Pt will exhibit proper squatting/lifting mechanics without reliance on external cues for correction of form PROGRESSING  Pt will be able to tolerate prolonged standing/walking activities > 30 minutes without provocation of knee pain  MET  Pt will improve FOTO score to > or = to 75 to indicate improved functional abilities UPDATED      Plan  Patient would benefit from: skilled physical therapy  Planned modality interventions: cryotherapy and thermotherapy: hydrocollator packs  Planned therapy interventions: ADL training, manual therapy, neuromuscular re-education, patient education, self care, strengthening, stretching, therapeutic activities, therapeutic exercise, home exercise program, graded exercise, graded activity, gait training, functional ROM exercises, flexibility, body mechanics training and balance  Frequency: 2x week  Duration in weeks: 4  Plan of Care beginning date: 2022  Plan of Care expiration date: 2022  Treatment plan discussed with: patient        Subjective Evaluation    History of Present Illness  Mechanism of injury: Pt continues to note good improvement in knee ROM, strength, gait, and stability  Pt feels she is about 60-70% improved since enrolling in therapy  Pt states she still has episodes of pain at night time but is becoming less frequent  Pt still struggles with knee ROM but is notable better with therapy and has had limited progress in the past several weeks with her flexion and extension mobility   Discussed considering low load long duration stretching device for home and wants to proceed with this, as well as getting a knee brace to help on days where her pain is worse  Pt would like to continue with therapy to further progress ROM, strength, stability, and walking tolerance     Pain  Current pain ratin  At best pain ratin  At worst pain ratin  Quality: sharp and dull ache      Diagnostic Tests  X-ray: abnormal  Patient Goals  Patient goals for therapy: decreased pain, improved balance, increased motion, increased strength, independence with ADLs/IADLs and return to sport/leisure activities          Objective     General Comments:      Knee Comments  Right knee AROM: 0-7-97*        Right knee MMT: Flexion 5/5 Extension 5/5    Right hip MMT: Flex 5/5 Abd 4/5 Extension 5/5    Gait Assessment: Pt ambulates unassisted with improved antalgic pattern, maintains knee flexion throughout gait cycle    SLS on level surface >30"     Functional squat reveals limited depth and weight shift to left at bottom of squat    Step ups reveals good concentric fluid, poor to fair eccentric control from 8" box    FOTO: 69 (47 @ IE)                ---------------ADD KX MODIFIER------------------   Diagnosis: R knee pain   Precautions: (-)   POC Expires: 22   Re-evaluation Date: 22   FOTO Scores/Date: Goal - 68; 9/15/22 - 47, 10/6/22-66;   -     Visit Count 8/10 9/10 1/10 6/10 7/10   Manuals 10/25 10/27 11/1 10/18 10/20   R knee PROM all planes IBRAHIMA  KD IBRAHIMA IBRAHIMA   Tibia-femoral distraction w/ medial opening                                Ther Ex 10/25 10/27 11/1 10/18 10/20   Bike 6 mins rocking 6 mins rocking  6 mins rocking 6 mins rocking  6 min rocking   Heel slides with strap 10"x10 (97*) 10"x10 NT 5"x10 (95*) PROM/ 5"x10 (100*) AROM    Prone quad stretch 1 min x 3 1 min x 3 30"x3 30"x3 1 min x3   Wall slides   3 mins (95*) 3 min (95*)    HS curls BTB 5"x20 BTB 5"x20  BTB 3"x20 BTB 3" x20   Heel prop 7 5# x3 mins 7 5# x 3 mins  7 5# x 3 mins 7 5# x 3 mins   Quad set (-7)  10"x10 (*-7) 10"x10 (-6)    LAQ    4# 5" 2x10 4#  5" 2x10   Standing TKE BTB 10"x10   BTB 10"x10 BTB 10"x10   Leg press  SL 55# 2x10  SL 45# 2x10 SL 45# 2x10   Goblet Squat 20# 2x10 depth to tolerance   2x10 depth to tolerance 2x10 depth to tolerance   Seated Flexion t-band Stretch BTB x 3 mins  BTB x 3 mins  BTB x 3 mins BTB x 3 mins   LS HS Stretch x2 mins x2 mins x2 mins x2 mins X 2 mins           FOTO   Re-assessed left knee and FOTO; updated PT prognosis/POC and goals     Neuro Re-Ed  10/27      SLS  15"x5       SLS [facility] reaching   High mat 3 cones x 3                       Ther Act 10/25 10/27 11/1 10/18 10/20   TRX        Step ups 10"x20    10" 2x10   Step downs Up and over 8"10/Reciprocal climbing x 5 laps   4"x10 6"x10 6"x20   Static lunges  @railing 2x10  W/TRX 2x10 ea W/TRX 2x10   Resisted fwd walking with TKE     15# x 7   Bulagarian squat  2x10 w/crutch 2x10  2x10 w/crutch for UE assist (off 6" plyo) 2 x10 w/crutch for UE assist (off 6"plyo)   Modalities

## 2022-11-03 ENCOUNTER — OFFICE VISIT (OUTPATIENT)
Dept: PHYSICAL THERAPY | Facility: CLINIC | Age: 67
End: 2022-11-03

## 2022-11-03 DIAGNOSIS — G89.29 CHRONIC PAIN OF RIGHT KNEE: Primary | ICD-10-CM

## 2022-11-03 DIAGNOSIS — M25.561 CHRONIC PAIN OF RIGHT KNEE: Primary | ICD-10-CM

## 2022-11-03 PROBLEM — M17.12 PRIMARY OSTEOARTHRITIS OF LEFT KNEE: Status: ACTIVE | Noted: 2022-09-06

## 2022-11-03 NOTE — PROGRESS NOTES
Daily Note     Today's date: 11/3/2022  Patient name: Kvng Mills  : 1955  MRN: [id number]  Referring provider: Des Harris MD  Dx:   Encounter Diagnosis     ICD-10-CM    1  Chronic pain of right knee  M25 561     G89 29        Start Time: 932  Stop Time: 1041  Total time in clinic (min): 69 minutes    Subjective:  Patient reports no new complaints or incidents  Reports feeling of improving knee range of motion      Objective: See treatment diary below      Assessment:  Patient continues to demonstrate improved use of available knee ROM in [facility]  Cueing with resisted walking on pushing into TKE  Cues with quad setting addressing hip ext compensation  Was able to introduce lateral heel dips with cues and demonstration on mechanics  Patient reports normal muscular fatigue vs pain      Plan: Continue per plan of care  Progress treatment as tolerated         ---------------ADD KX MODIFIER------------------   Diagnosis: R knee pain   Precautions: (-)   POC Expires: 22   Re-evaluation Date: 22   FOTO Scores/Date: Goal - 68; 9/15/22 - 47, 10/6/22-66;   - 69    Visit Count 8/10 9/10 1/10 2/10 7/10   Manuals 10/25 10/27 11/1 11/3 10/20   R knee PROM all planes IBRAHIMA  KD IBRAHIMA IBRAHIMA   Tibia-femoral distraction w/ medial opening                                Ther Ex 10/25 10/27 11/1 11/3 10/20   Bike 6 mins rocking 6 mins rocking  6 mins rocking 6 mins rocking  6 min rocking   Heel slides with strap 10"x10 (97*) 10"x10 NT     Prone quad stretch 1 min x 3 1 min x 3 30"x3 1 min x 3 1 min x3   Wall slides   3 mins (95*)     HS curls BTB 5"x20 BTB 5"x20  BTB 3"x20 BTB 3" x20   Heel prop 7 5# x3 mins 7 5# x 3 mins  10# x 3 mins 7 5# x 3 mins   Quad set (-7)  10"x10 (*-7) 10"x10     LAQ     4#  5" 2x10   Standing TKE BTB 10"x10   BTB 10"x10 BTB 10"x10   Leg press  SL 55# 2x10  SL 55# 2x10 SL 45# 2x10   Goblet Squat 20# 2x10 depth to tolerance    2x10 depth to tolerance   Seated Flexion t-band Stretch BTB x 3 mins  BTB x 3 mins  BTB x 3 mins BTB x 3 mins   LS HS Stretch x2 mins x2 mins x2 mins x2 mins X 2 mins   Heel Taps    Lat 6"x10    FOTO   Re-assessed left knee and FOTO; updated PT prognosis/POC and goals     Neuro Re-Ed  10/27  11/3    SLS  15"x5       SLS [facility] reaching   High mat 3 cones x 3                       Ther Act 10/25 10/27 11/1 11/3 10/20   TRX        Step ups 10"x20   Up and over 8"x20 10" 2x10   Step downs Up and over 8"10/Reciprocal climbing x 5 laps    6"x20   Static lunges  @railing 2x10  2x10 BL  W/TRX 2x10   Resisted fwd walking with TKE    Fwd/retro 15# x 5 laps ea 15# x 7   Bulagarian squat  2x10 w/crutch 2x10   2 x10 w/crutch for UE assist (off 6"plyo)   Modalities

## 2022-11-08 ENCOUNTER — OFFICE VISIT (OUTPATIENT)
Dept: PHYSICAL THERAPY | Facility: CLINIC | Age: 67
End: 2022-11-08

## 2022-11-08 DIAGNOSIS — M25.561 CHRONIC PAIN OF RIGHT KNEE: Primary | ICD-10-CM

## 2022-11-08 DIAGNOSIS — G89.29 CHRONIC PAIN OF RIGHT KNEE: Primary | ICD-10-CM

## 2022-11-08 NOTE — PROGRESS NOTES
Daily Note     Today's date: 2022  Patient name: Riri Manriquez  : 1955  MRN: [id number]  Referring provider: Fina Franco MD  Dx:   Encounter Diagnosis     ICD-10-CM    1  Chronic pain of right knee  M25 561     G89 29        Start Time: 1100  Stop Time: 1145  Total time in clinic (min): 45 minutes    Subjective: Pt states she has to leave appt today a little early as she has appt to get fitted for her knee brace today  Pt is also awaiting knee extension long duration stretch device from [facility]  Objective: See treatment diary below      Assessment: Modified session today as pt had to leave early to make another appointment to  knee brace  Pt continues to progress well with AROM and able to achieve 0-6-95* today with minimal end range discomfort  Pt also in the process of obtaining [facility] long duration static strength device to work on extension ROM  Pt with significant improvement in movement quality with step ups and static lunges today and first session where step ups were pain-free  Pt able to increase weight with resisted walking but remains reliant on initial cues for form/sequencing  Pt instructed to continue with home exercises daily as symptoms allow  Plan: Continue per plan of care  Progress treatment as tolerated         ---------------ADD KX MODIFIER------------------   Diagnosis: R knee pain   Precautions: (-)   POC Expires: 22   Re-evaluation Date: 22   FOTO Scores/Date: Goal - 68; 9/15/22 - 47, 10/6/22-66;   -     Visit Count 8/10 9/10 1/10 2/10 3/10   Manuals 10/25 10/27 11/1 11/3 11/8   R knee PROM all planes IBRAHIMA  KD IBRAHIMA KD   Tibia-femoral distraction w/ medial opening                                Ther Ex 10/25 10/27 11/1 11/3 11/8   Bike 6 mins rocking 6 mins rocking  6 mins rocking 6 mins rocking  6 min rocking   Heel slides with strap 10"x10 (97*) 10"x10 NT  10x10" (95*)   Prone quad stretch 1 min x 3 1 min x 3 30"x3 1 min x 3 3x30"    Wall slides   3 mins (95*)     HS curls BTB 5"x20 BTB 5"x20  BTB 3"x20 BTB 3" x20   Heel prop 7 5# x3 mins 7 5# x 3 mins  10# x 3 mins    Quad set (-7)  10"x10 (*-7) 10"x10  10x10" (-6*)   LAQ         Standing TKE BTB 10"x10   BTB 10"x10 BTB 10"x10   Leg press  SL 55# 2x10  SL 55# 2x10 SL 55# 2x10   Goblet Squat 20# 2x10 depth to tolerance        Seated Flexion t-band Stretch BTB x 3 mins  BTB x 3 mins  BTB x 3 mins    LS HS Stretch x2 mins x2 mins x2 mins x2 mins X 2 mins   Heel Taps    Lat 6"x10    FOTO   Re-assessed left knee and FOTO; updated PT prognosis/POC and goals     Neuro Re-Ed  10/27  11/3    SLS  15"x5       SLS [facility] reaching   High mat 3 cones x 3                       Ther Act 10/25 10/27 11/1 11/3 11/8   TRX        Step ups 10"x20   Up and over 8"x20 10" 2x10 ea (fwd/lat)   Step downs Up and over 8"10/Reciprocal climbing x 5 laps    6"x20   Static lunges  @railing 2x10  2x10 BL  2x10   Resisted fwd walking with TKE    Fwd/retro 15# x 5 laps ea Fwd/retro 20# x 5 laps ea   Bulagarian squat  2x10 w/crutch 2x10      Modalities

## 2022-11-10 ENCOUNTER — OFFICE VISIT (OUTPATIENT)
Dept: PHYSICAL THERAPY | Facility: CLINIC | Age: 67
End: 2022-11-10

## 2022-11-10 DIAGNOSIS — M25.561 CHRONIC PAIN OF RIGHT KNEE: Primary | ICD-10-CM

## 2022-11-10 DIAGNOSIS — G89.29 CHRONIC PAIN OF RIGHT KNEE: Primary | ICD-10-CM

## 2022-11-10 NOTE — PROGRESS NOTES
Daily Note     Today's date: 11/10/2022  Patient name: Riri Manriquez  : 1955  MRN: [id number]  Referring provider: Fina Franco MD  Dx:   Encounter Diagnosis     ICD-10-CM    1  Chronic pain of right knee  M25 561     G89 29        Start Time: 1015  Stop Time: 1110  Total time in clinic (min): 55 minutes    Subjective: Pt reports getting knee  brace yesterday, tried it out yesterday and did feel it was helping somewhat  Pt denies any current knee pain, still waiting to get [facility] extensionator for home  Objective: See treatment diary below      Assessment: Pt with slight regression in knee flexion mobility both actively and passively today with limitations around 91* today due to end range stiffness/discomfort and limited with heel slides at 91* today  Pt was able to progress to static lunges without use of TRX straps and only light UE assistance on handrail with good depth and no provocation of knee pain  Resumed goblet squats today with good tolerance to depth of low mat table with cues to avoid weight shift to left at bottom portion of squat during completion  Trialed extensionator simulation today with use of BP cuff for self inflation to tolerance with 3 min hold to introduce as she gets ready to get home unit to further address knee extension ROM limitations  Plan: Continue per plan of care  Progress treatment as tolerated         ---------------ADD KX MODIFIER------------------   Diagnosis: R knee pain   Precautions: (-)   POC Expires: 22   Re-evaluation Date: 22   FOTO Scores/Date: Goal - 68; 9/15/22 - 47, 10/6/22-66;   - 69    Visit Count 4/10 9/10 1/10 2/10 3/10   Manuals 11/10 10/27 11/1 11/3 11/8   R knee PROM all planes KD  KD IBRAHIMA KD   Tibia-femoral distraction w/ medial opening                                Ther Ex 11/10 10/27 11/1 11/3 11/8   Bike 6 mins rocking 6 mins rocking  6 mins rocking 6 mins rocking  6 min rocking   Heel slides with strap 10"x10 (91*) 10"x10 NT  10x10" (95*)   Prone quad stretch 1 min x 3 1 min x 3 30"x3 1 min x 3 3x30"    Wall slides   3 mins (95*)     HS curls BTB 5"x20 BTB 5"x20  BTB 3"x20 BTB 3" x20   Heel prop extensionator 3 min 7 5# x 3 mins  10# x 3 mins    Quad set (-6)  10"x10 (*-7) 10"x10  10x10" (-6*)   LAQ         Standing TKE BTB 10"x10   BTB 10"x10 BTB 10"x10   Leg press  SL 55# 2x10  SL 55# 2x10 SL 55# 2x10   Goblet Squat 20# 2x10 depth to tolerance        Seated Flexion t-band Stretch   BTB x 3 mins  BTB x 3 mins    LS HS Stretch x2 mins x2 mins x2 mins x2 mins X 2 mins   Heel Taps    Lat 6"x10    FOTO   Re-assessed left knee and FOTO; updated PT prognosis/POC and goals     Neuro Re-Ed  10/27  11/3    SLS  15"x5       SLS [facility] reaching   High mat 3 cones x 3                       Ther Act 10/25 10/27 11/1 11/3 11/8   TRX        Step ups 10" 2x10 ea (fwd/lat)   Up and over 8"x20 10" 2x10 ea (fwd/lat)   Step downs 6" x 20    6"x20   Static lunges 2x10 @railing 2x10  2x10 BL  2x10   Resisted fwd walking with TKE Fwd/retro 20# x 5 laps ea   Fwd/retro 15# x 5 laps ea Fwd/retro 20# x 5 laps ea   Bulagarian squat  2x10 w/crutch 2x10      Modalities

## 2022-11-15 ENCOUNTER — APPOINTMENT (OUTPATIENT)
Dept: PHYSICAL THERAPY | Facility: CLINIC | Age: 67
End: 2022-11-15

## 2022-11-16 ENCOUNTER — TELEPHONE (OUTPATIENT)
Dept: FAMILY MEDICINE CLINIC | Facility: MEDICAL CENTER | Age: 67
End: 2022-11-16

## 2022-11-16 NOTE — TELEPHONE ENCOUNTER
The rep from [employer] stopped in to see if you could sign the PT note (it's in your bin), so that is within the 30 days for medicare to cover  Fax back to [phone]

## 2022-11-17 ENCOUNTER — APPOINTMENT (OUTPATIENT)
Dept: PHYSICAL THERAPY | Facility: CLINIC | Age: 67
End: 2022-11-17

## 2022-11-22 ENCOUNTER — OFFICE VISIT (OUTPATIENT)
Dept: PHYSICAL THERAPY | Facility: CLINIC | Age: 67
End: 2022-11-22

## 2022-11-22 DIAGNOSIS — M25.561 CHRONIC PAIN OF RIGHT KNEE: Primary | ICD-10-CM

## 2022-11-22 DIAGNOSIS — G89.29 CHRONIC PAIN OF RIGHT KNEE: Primary | ICD-10-CM

## 2022-11-22 NOTE — PROGRESS NOTES
Daily Note     Today's date: 2022  Patient name: Samir Bermudez  : 1955  MRN: [id number]  Referring provider: Chas Ford MD  Dx:   Encounter Diagnosis     ICD-10-CM    1  Chronic pain of right knee  M25 561     G89 29           Start Time: 930  Stop Time: 1039  Total time in clinic (min): 69 minutes    Subjective: patient reports being sick over past 10 days  Reports being unable to perform HEP due to illness  Objective: See treatment diary below      Assessment:  Patient presents with regression of knee extension due to inability to perform HEP  Knee flexion remains unchanged  Cueing goblet squats on weight distribution and step ups for weight acceptance  Reports decreased stiffness post      Plan: Continue per plan of care  Progress treatment as tolerated         ---------------ADD KX MODIFIER------------------   Diagnosis: R knee pain   Precautions: (-)   POC Expires: 22   Re-evaluation Date: 22   FOTO Scores/Date: Goal - 68; 9/15/22 - 47, 10/6/22-66;   -     Visit Count 4/10 5/10 1/10 2/10 3/10   Manuals 11/10 11/22 11/1 11/3 11/8   R knee PROM all planes KD IBRAHIMA KD IBRAHIMA KD   Tibia-femoral distraction w/ medial opening                                Ther Ex 11/10 11/22 11/1 11/3 11/8   Bike 6 mins rocking 6 mins rocking 6 mins rocking 6 mins rocking  6 min rocking   Heel slides with strap 10"x10 (91*) 10"x10 (91*) NT  10x10" (95*)   Prone quad stretch 1 min x 3 1 min x 3 30"x3 1 min x 3 3x30"    Wall slides   3 mins (95*)     HS curls BTB 5"x20 BTB 5"x20  BTB 3"x20 BTB 3" x20   Heel prop extensionator 3 min 10# x 3 mins  10# x 3 mins    Quad set (-6) 10"x10 (-11) 10"x10 (*-7) 10"x10  10x10" (-6*)   LAQ         Standing TKE BTB 10"x10 BTB 10"x10  BTB 10"x10 BTB 10"x10   Leg press  SL 55# 2x10  SL 55# 2x10 SL 55# 2x10   Goblet Squat 20# 2x10 depth to tolerance 20# 2x10 depth to tolerance       Seated Flexion t-band Stretch     BTB x 3 mins    LS HS Stretch x2 mins x2 mins x2 mins x2 mins X 2 mins   Heel Taps    Lat 6"x10    FOTO   Re-assessed left knee and FOTO; updated PT prognosis/POC and goals     Neuro Re-Ed    11/3    SLS        SLS [facility] reaching                          Ther Act 10/25  11/1 11/3 11/8   TRX        Step ups 10" 2x10 ea (fwd/lat) Fwd/Lat 10"2x10 ea  Up and over 8"x20 10" 2x10 ea (fwd/lat)   Step downs 6" x 20 6"x20   6"x20   Static lunges 2x10 2x10 BL  2x10 BL  2x10   Resisted fwd walking with TKE Fwd/retro 20# x 5 laps ea   Fwd/retro 15# x 5 laps ea Fwd/retro 20# x 5 laps ea   Bulagarian squat        Modalities

## 2022-11-25 ENCOUNTER — OFFICE VISIT (OUTPATIENT)
Dept: PHYSICAL THERAPY | Facility: CLINIC | Age: 67
End: 2022-11-25

## 2022-11-25 DIAGNOSIS — G89.29 CHRONIC PAIN OF RIGHT KNEE: Primary | ICD-10-CM

## 2022-11-25 DIAGNOSIS — M25.561 CHRONIC PAIN OF RIGHT KNEE: Primary | ICD-10-CM

## 2022-11-25 NOTE — PROGRESS NOTES
Daily Note     Today's date: 2022  Patient name: Thu Burton  : 1955  MRN: [id number]  Referring provider: Laura Buerger, MD  Dx:   Encounter Diagnosis     ICD-10-CM    1  Chronic pain of right knee  M25 561     G89 29           Start Time: 1014  Stop Time: 1127  Total time in clinic (min): 73 minutes    Subjective: patient reports resuming HEP working on improving ROM  Denies any incidents or complaints  Objective: See treatment diary below      Assessment:  Patient demonstrates improved knee ext and flexion when compared to previous therapy session measuring 0-7-97  Patient was less reliant on cues addressing weight distribution with both goblet squats and step ups  Was abl to perform step downs in greater ROM performing from 8" box  Was able to re-introduce SLS with reaching out of COG demonstrating good control      Plan: Continue per plan of care  Progress treatment as tolerated         ---------------ADD KX MODIFIER------------------   Diagnosis: R knee pain   Precautions: (-)   POC Expires: 22   Re-evaluation Date: 22   FOTO Scores/Date: Goal - 68; 9/15/22 - 47, 10/6/22-66;   -     Visit Count 4/10 5/10 6/10 2/10 3/10   Manuals 11/10 11/22 11/25 11/3 11/8   R knee PROM all planes KD IBRAHIMA  IBRAHIMA KD   Tibia-femoral distraction w/ medial opening                                Ther Ex 11/10 11/22 11/25 11/3 11/8   Bike 6 mins rocking 6 mins rocking 6 mins rocking to improve ROM 6 mins rocking  6 min rocking   Heel slides with strap 10"x10 (91*) 10"x10 (91*) 10"x10 (97*)  10x10" (95*)   Prone quad stretch 1 min x 3 1 min x 3 1 mins x 3 1 min x 3 3x30"    Wall slides        HS curls BTB 5"x20 BTB 5"x20  BTB 3"x20 BTB 3" x20   Heel prop extensionator 3 min 10# x 3 mins 10# x 3 mins  10# x 3 mins    Quad set (-6) 10"x10 (-11) 10"x10 (-7) 10"x10  10x10" (-6*)   LAQ         Standing TKE BTB 10"x10 BTB 10"x10 BTB 10"x10 BTB 10"x10 BTB 10"x10   Leg press  SL 55# 2x10 SL 65# 2x10 SL 55# 2x10 SL 55# 2x10   Goblet Squat 20# 2x10 depth to tolerance 20# 2x10 depth to tolerance 20# depth to tolerance x20      Seated Flexion t-band Stretch    BTBx 3 mins BTB x 3 mins    LS HS Stretch x2 mins x2 mins X 2mins  x2 mins X 2 mins   Heel Taps    Lat 6"x10    FOTO        Neuro Re-Ed   11/25 11/3    SLS   3# DB pass x 10     SLS [facility] reaching    Low mat x 3                      Ther Act 10/25   11/3 11/8   TRX        Step ups 10" 2x10 ea (fwd/lat) Fwd/Lat 10"2x10 ea Fwd/Lat 10" 2x10 Up and over 8"x20 10" 2x10 ea (fwd/lat)   Step downs 6" x 20 6"x20 8" 2x10  6"x20   Static lunges 2x10 2x10 BL  2x10 BL  2x10   Resisted fwd walking with TKE Fwd/retro 20# x 5 laps ea  Fwd/retro 20# x 5 laps ea  Fwd/retro 15# x 5 laps ea Fwd/retro 20# x 5 laps ea   Bulagarian squat        Modalities

## 2022-11-29 ENCOUNTER — EVALUATION (OUTPATIENT)
Dept: PHYSICAL THERAPY | Facility: CLINIC | Age: 67
End: 2022-11-29

## 2022-11-29 DIAGNOSIS — M25.561 CHRONIC PAIN OF RIGHT KNEE: Primary | ICD-10-CM

## 2022-11-29 DIAGNOSIS — G89.29 CHRONIC PAIN OF RIGHT KNEE: Primary | ICD-10-CM

## 2022-11-29 NOTE — PROGRESS NOTES
PT Discharge    Today's date: 2022  Patient name: Yeny Munson  : 1955  MRN: [id number]  Referring provider: David Stack MD  Dx:   Encounter Diagnosis     ICD-10-CM    1  Chronic pain of right knee  M25 561     G89 29           Start Time: 930  Stop Time: 1015  Total time in clinic (min): 45 minutes    Assessment  Assessment details: Pt has been seen for 24 visits and has made excellent subjective/objective improvements since enrolling in therapy with improved FOTO score from 47 to 72 since initial evaluation  Pt has returned all normal ADLs and recreational activities without pain or limitation and is appropriate for discharge to home exercise program at this time  Pt encouraged to continue with HEP on regular basis per tolerance and was educated on how to progress/regress exercises per symptoms/tolerance  Pt is in agreement with plan      Impairments: abnormal gait, abnormal or restricted ROM, activity intolerance, impaired balance, impaired physical strength, lacks appropriate home exercise program and pain with function    Goals  ST weeks  Pt will demonstrate good understanding and compliance with HEP  MET  Pt will increase active right knee extension to 0* to normalize gait with TKE at IC phase of gait NOT MET  Pt will increase active right knee flexion to 120* to normalize gait and improve tolerance/ability to perform functional activities to include stairs and squatting NOT MET  Pt will decrease right knee pain to 3-4/10 with activity MET    LT weeks  Pt will increase right hip/knee strength to 5-/5 to allow for improved ability to squat, negotiate stairs, and prolonged community ambulation MET  Pt will decrease right knee pain to 0-1/10 with activity MET  Pt will demonstrate negative Elys test on right MET  Pt will ambulate with least restrictive AD and normal gait mechanics MET  Pt will exhibit proper squatting/lifting mechanics without reliance on external cues for correction of form MET  Pt will be able to tolerate prolonged standing/walking activities > 30 minutes without provocation of knee pain  MET  Pt will improve FOTO score to > or = to 75 to indicate improved functional abilities NEARLY MET      Plan  Plan details: Discharge to [facility]  Patient would benefit from: skilled physical therapy  Planned modality interventions: cryotherapy and thermotherapy: hydrocollator packs  Planned therapy interventions: ADL training, manual therapy, neuromuscular re-education, patient education, self care, strengthening, stretching, therapeutic activities, therapeutic exercise, home exercise program, graded exercise, graded activity, gait training, functional ROM exercises, flexibility, body mechanics training and balance  Plan of Care beginning date: 2022  Plan of Care expiration date: 2022  Treatment plan discussed with: patient        Subjective Evaluation    History of Present Illness  Mechanism of injury: Pt continues to note good overall improvement in therapy but was recently sick and caused her to be out of PT for nearly 2 weeks where she felt she had regressed slightly  Pt is still awaiting her ERMI device for home to help her work on her knee extension mobility but was told it should be delivered tomorrow  Pt notes improved strength/motion since enrolling  Outside of lingering ROM limitations, pt denies any other limitations at this time  Pt overall feels she is doing her daily activities without limitation and is ready for discharge to [facility]     Pain  Current pain ratin  At best pain ratin  At worst pain ratin  Quality: dull ache      Diagnostic Tests  X-ray: abnormal        Objective     General Comments:      Knee Comments  Right knee AROM: 0-7-92* (AA to 94*)        Right knee MMT: Flexion 5/5 Extension 5/5    Right hip MMT: Flex 5/5 Abd 4+/5 Extension 5/5    Gait Assessment: Pt ambulates unassisted with improved antalgic pattern, maintains knee flexion throughout gait cycle    SLS on level surface >30"     Functional squat reveals improving depth, still exhibits slight weight shift towards left at bottom of squat    FOTO: 72 (47 @ IE)                ---------------ADD KX MODIFIER------------------   Diagnosis: R knee pain   Precautions: (-)   POC Expires: 11/29/22   Re-evaluation Date: 11/29/22   FOTO Scores/Date: Goal - 68; 9/15/22 - 47, 10/6/22-66;  11/1 - 69; 11/29 - 72   Visit Count 4/10 5/10 6/10 1/10 3/10   Manuals 11/10 11/22 11/25 11/29 11/8   R knee PROM all planes KD IBRAHIMA  KD KD   Tibia-femoral distraction w/ medial opening                                Ther Ex 11/10 11/22 11/25 11/29 11/8   Bike 6 mins rocking 6 mins rocking 6 mins rocking to improve ROM 6 mins rocking  6 min rocking   Heel slides with strap 10"x10 (91*) 10"x10 (91*) 10"x10 (97*) 10x10" 10x10" (95*)   Prone quad stretch 1 min x 3 1 min x 3 1 mins x 3 1 min x 3 3x30"    Wall slides        HS curls BTB 5"x20 BTB 5"x20   BTB 3" x20   Heel prop extensionator 3 min 10# x 3 mins 10# x 3 mins      Quad set (-6) 10"x10 (-11) 10"x10 (-7) 10"x10  10x10" (-6*)   LAQ         Standing TKE BTB 10"x10 BTB 10"x10 BTB 10"x10  BTB 10"x10   Leg press  SL 55# 2x10 SL 65# 2x10  SL 55# 2x10   Goblet Squat 20# 2x10 depth to tolerance 20# 2x10 depth to tolerance 20# depth to tolerance x20      Seated Flexion t-band Stretch    BTBx 3 mins     LS HS Stretch x2 mins x2 mins X 2mins  x2 mins X 2 mins   Heel Taps        FOTO    Re-assessed right knee and FOTO: HEP creation/instruction, educated on importance of continued HEP and how to progress/regress as needed     Neuro Re-Ed   11/25     SLS   3# DB pass x 10     SLS [facility] reaching    Low mat x 3                      Ther Act 10/25    11/8   TRX        Step ups 10" 2x10 ea (fwd/lat) Fwd/Lat 10"2x10 ea Fwd/Lat 10" 2x10  10" 2x10 ea (fwd/lat)   Step downs 6" x 20 6"x20 8" 2x10  6"x20   Static lunges 2x10 2x10 BL    2x10   Resisted fwd walking with TKE Fwd/retro 20# x 5 laps ea Fwd/retro 20# x 5 laps ea   Fwd/retro 20# x 5 laps ea   Bulagarian squat        Modalities